# Patient Record
Sex: MALE | Race: WHITE | NOT HISPANIC OR LATINO
[De-identification: names, ages, dates, MRNs, and addresses within clinical notes are randomized per-mention and may not be internally consistent; named-entity substitution may affect disease eponyms.]

---

## 2017-05-05 ENCOUNTER — APPOINTMENT (OUTPATIENT)
Dept: ORTHOPEDIC SURGERY | Facility: CLINIC | Age: 72
End: 2017-05-05

## 2017-05-05 VITALS — BODY MASS INDEX: 24.5 KG/M2 | HEIGHT: 71 IN | WEIGHT: 175 LBS

## 2017-05-05 DIAGNOSIS — M77.01 MEDIAL EPICONDYLITIS, RIGHT ELBOW: ICD-10-CM

## 2017-05-14 PROBLEM — M77.01 MEDIAL EPICONDYLITIS, RIGHT: Status: ACTIVE | Noted: 2017-05-14

## 2017-07-13 ENCOUNTER — APPOINTMENT (OUTPATIENT)
Dept: OTOLARYNGOLOGY | Facility: CLINIC | Age: 72
End: 2017-07-13

## 2018-06-19 ENCOUNTER — RECORD ABSTRACTING (OUTPATIENT)
Age: 73
End: 2018-06-19

## 2018-06-19 DIAGNOSIS — Z87.39 PERSONAL HISTORY OF OTHER DISEASES OF THE MUSCULOSKELETAL SYSTEM AND CONNECTIVE TISSUE: ICD-10-CM

## 2018-06-19 DIAGNOSIS — M54.12 RADICULOPATHY, CERVICAL REGION: ICD-10-CM

## 2018-06-19 DIAGNOSIS — Z83.3 FAMILY HISTORY OF DIABETES MELLITUS: ICD-10-CM

## 2018-06-19 DIAGNOSIS — Z82.49 FAMILY HISTORY OF ISCHEMIC HEART DISEASE AND OTHER DISEASES OF THE CIRCULATORY SYSTEM: ICD-10-CM

## 2018-06-19 DIAGNOSIS — Z79.52 LONG TERM (CURRENT) USE OF SYSTEMIC STEROIDS: ICD-10-CM

## 2018-06-19 DIAGNOSIS — M25.522 PAIN IN LEFT ELBOW: ICD-10-CM

## 2018-06-19 DIAGNOSIS — Z87.891 PERSONAL HISTORY OF NICOTINE DEPENDENCE: ICD-10-CM

## 2018-06-19 DIAGNOSIS — T14.8XXA OTHER INJURY OF UNSPECIFIED BODY REGION, INITIAL ENCOUNTER: ICD-10-CM

## 2018-06-19 RX ORDER — ASPIRIN ENTERIC COATED TABLETS 81 MG 81 MG/1
81 TABLET, DELAYED RELEASE ORAL DAILY
Refills: 0 | Status: ACTIVE | COMMUNITY

## 2018-06-26 ENCOUNTER — APPOINTMENT (OUTPATIENT)
Dept: ORTHOPEDIC SURGERY | Facility: CLINIC | Age: 73
End: 2018-06-26
Payer: MEDICARE

## 2018-06-26 VITALS
SYSTOLIC BLOOD PRESSURE: 141 MMHG | DIASTOLIC BLOOD PRESSURE: 85 MMHG | BODY MASS INDEX: 24.92 KG/M2 | HEART RATE: 59 BPM | WEIGHT: 178 LBS | HEIGHT: 71 IN

## 2018-06-26 PROCEDURE — 99214 OFFICE O/P EST MOD 30 MIN: CPT

## 2018-06-26 PROCEDURE — 73630 X-RAY EXAM OF FOOT: CPT | Mod: RT

## 2018-08-19 ENCOUNTER — TRANSCRIPTION ENCOUNTER (OUTPATIENT)
Age: 73
End: 2018-08-19

## 2018-08-20 ENCOUNTER — OUTPATIENT (OUTPATIENT)
Dept: OUTPATIENT SERVICES | Facility: HOSPITAL | Age: 73
LOS: 1 days | End: 2018-08-20
Payer: MEDICARE

## 2018-08-20 VITALS
SYSTOLIC BLOOD PRESSURE: 111 MMHG | DIASTOLIC BLOOD PRESSURE: 66 MMHG | RESPIRATION RATE: 18 BRPM | HEART RATE: 61 BPM | OXYGEN SATURATION: 96 %

## 2018-08-20 VITALS
HEIGHT: 69.5 IN | TEMPERATURE: 98 F | HEART RATE: 52 BPM | OXYGEN SATURATION: 96 % | DIASTOLIC BLOOD PRESSURE: 74 MMHG | WEIGHT: 175.93 LBS | RESPIRATION RATE: 10 BRPM | SYSTOLIC BLOOD PRESSURE: 135 MMHG

## 2018-08-20 DIAGNOSIS — Z98.52 VASECTOMY STATUS: Chronic | ICD-10-CM

## 2018-08-20 DIAGNOSIS — H25.12 AGE-RELATED NUCLEAR CATARACT, LEFT EYE: ICD-10-CM

## 2018-08-20 DIAGNOSIS — D17.1 BENIGN LIPOMATOUS NEOPLASM OF SKIN AND SUBCUTANEOUS TISSUE OF TRUNK: Chronic | ICD-10-CM

## 2018-08-20 DIAGNOSIS — Z98.890 OTHER SPECIFIED POSTPROCEDURAL STATES: Chronic | ICD-10-CM

## 2018-08-20 PROCEDURE — V2788: CPT

## 2018-08-20 PROCEDURE — 66984 XCAPSL CTRC RMVL W/O ECP: CPT | Mod: LT

## 2018-08-20 NOTE — ASU DISCHARGE PLAN (ADULT/PEDIATRIC). - NOTIFY
Persistent Nausea and Vomiting/Inability to Tolerate Liquids or Foods/Pain not relieved by Medications/Bleeding that does not stop/Fever greater than 101

## 2019-06-04 ENCOUNTER — TRANSCRIPTION ENCOUNTER (OUTPATIENT)
Age: 74
End: 2019-06-04

## 2019-06-20 PROBLEM — I10 ESSENTIAL (PRIMARY) HYPERTENSION: Chronic | Status: ACTIVE | Noted: 2018-08-20

## 2019-06-20 PROBLEM — E78.5 HYPERLIPIDEMIA, UNSPECIFIED: Chronic | Status: ACTIVE | Noted: 2018-08-20

## 2019-07-26 ENCOUNTER — APPOINTMENT (OUTPATIENT)
Dept: INTERNAL MEDICINE | Facility: CLINIC | Age: 74
End: 2019-07-26
Payer: MEDICARE

## 2019-07-26 VITALS
TEMPERATURE: 98 F | DIASTOLIC BLOOD PRESSURE: 80 MMHG | HEIGHT: 71 IN | WEIGHT: 175 LBS | HEART RATE: 59 BPM | BODY MASS INDEX: 24.5 KG/M2 | SYSTOLIC BLOOD PRESSURE: 120 MMHG | OXYGEN SATURATION: 98 %

## 2019-07-26 DIAGNOSIS — Z86.39 PERSONAL HISTORY OF OTHER ENDOCRINE, NUTRITIONAL AND METABOLIC DISEASE: ICD-10-CM

## 2019-07-26 DIAGNOSIS — Z86.79 PERSONAL HISTORY OF OTHER DISEASES OF THE CIRCULATORY SYSTEM: ICD-10-CM

## 2019-07-26 PROCEDURE — 93000 ELECTROCARDIOGRAM COMPLETE: CPT

## 2019-07-26 PROCEDURE — 82272 OCCULT BLD FECES 1-3 TESTS: CPT

## 2019-07-26 PROCEDURE — 36415 COLL VENOUS BLD VENIPUNCTURE: CPT

## 2019-07-26 PROCEDURE — G0439: CPT

## 2019-07-26 NOTE — HEALTH RISK ASSESSMENT
[With Patient/Caregiver] : With Patient/Caregiver [Designated Healthcare Proxy] : Designated healthcare proxy [Relationship: ___] : Relationship: [unfilled] [I will adhere to the patient's wishes as expressed in the advance directive except as noted below.] : I will adhere to the patient's wishes as expressed in the advance directive except as noted below [AdvancecareDate] : 7/26/2019

## 2019-07-26 NOTE — ASSESSMENT
[FreeTextEntry1] : normal exam-testing will be done. He is to he colonoscopy for further evaluation his colorectal area

## 2019-07-26 NOTE — HISTORY OF PRESENT ILLNESS
[de-identified] : the patient i74-year-old male who comes in for comprehensive physical. He is treated for hypertension and hyperlipidemia and his blood pressure has been in good control. he has had a up for palpitationsby cardiologyand the testing was negative. He is physically activeand denies chest pain swelling fainting winded or cough and  is no longer getting palpitations. GI review systems is normalwithout change in bowel r melenaand he is du for colonoscopy. He has no  symptoms At the present time he takes 2 dand while he stopped smokingat age 33 he would occasionally have one cigarette per week

## 2019-07-26 NOTE — PHYSICAL EXAM
[Normal] : normal gait, coordination grossly intact, no focal deficits [FreeTextEntry1] : guaiac is negative. A fold is felt  distal common at 8:00 and will be seen with colonoscopy [de-identified] : BPH withou nodule

## 2019-07-27 LAB
ALBUMIN SERPL ELPH-MCNC: 4.4 G/DL
ALP BLD-CCNC: 53 U/L
ALT SERPL-CCNC: 25 U/L
ANION GAP SERPL CALC-SCNC: 11 MMOL/L
APPEARANCE: ABNORMAL
AST SERPL-CCNC: 19 U/L
BACTERIA: NEGATIVE
BASOPHILS # BLD AUTO: 0.04 K/UL
BASOPHILS NFR BLD AUTO: 0.7 %
BILIRUB SERPL-MCNC: 2.3 MG/DL
BILIRUBIN URINE: NEGATIVE
BLOOD URINE: NEGATIVE
BUN SERPL-MCNC: 30 MG/DL
CALCIUM SERPL-MCNC: 9.5 MG/DL
CHLORIDE SERPL-SCNC: 105 MMOL/L
CHOLEST SERPL-MCNC: 187 MG/DL
CHOLEST/HDLC SERPL: 2.2 RATIO
CK SERPL-CCNC: 199 U/L
CO2 SERPL-SCNC: 27 MMOL/L
COLOR: YELLOW
CREAT SERPL-MCNC: 1.03 MG/DL
EOSINOPHIL # BLD AUTO: 0.1 K/UL
EOSINOPHIL NFR BLD AUTO: 1.8 %
GLUCOSE QUALITATIVE U: NEGATIVE
GLUCOSE SERPL-MCNC: 109 MG/DL
HCT VFR BLD CALC: 44.7 %
HDLC SERPL-MCNC: 86 MG/DL
HGB BLD-MCNC: 14.7 G/DL
HYALINE CASTS: 0 /LPF
IMM GRANULOCYTES NFR BLD AUTO: 0.2 %
KETONES URINE: NEGATIVE
LDLC SERPL CALC-MCNC: 89 MG/DL
LEUKOCYTE ESTERASE URINE: NEGATIVE
LYMPHOCYTES # BLD AUTO: 1.79 K/UL
LYMPHOCYTES NFR BLD AUTO: 32.9 %
MAN DIFF?: NORMAL
MCHC RBC-ENTMCNC: 29.8 PG
MCHC RBC-ENTMCNC: 32.9 GM/DL
MCV RBC AUTO: 90.5 FL
MICROSCOPIC-UA: NORMAL
MONOCYTES # BLD AUTO: 0.5 K/UL
MONOCYTES NFR BLD AUTO: 9.2 %
NEUTROPHILS # BLD AUTO: 3 K/UL
NEUTROPHILS NFR BLD AUTO: 55.2 %
NITRITE URINE: NEGATIVE
PH URINE: 5.5
PLATELET # BLD AUTO: 199 K/UL
POTASSIUM SERPL-SCNC: 4.8 MMOL/L
PROT SERPL-MCNC: 7.3 G/DL
PROTEIN URINE: NORMAL
RBC # BLD: 4.94 M/UL
RBC # FLD: 13.7 %
RED BLOOD CELLS URINE: 0 /HPF
SODIUM SERPL-SCNC: 143 MMOL/L
SPECIFIC GRAVITY URINE: 1.03
SQUAMOUS EPITHELIAL CELLS: 0 /HPF
TRIGL SERPL-MCNC: 59 MG/DL
UROBILINOGEN URINE: NORMAL
WBC # FLD AUTO: 5.44 K/UL
WHITE BLOOD CELLS URINE: 0 /HPF

## 2019-08-15 ENCOUNTER — APPOINTMENT (OUTPATIENT)
Dept: INTERNAL MEDICINE | Facility: CLINIC | Age: 74
End: 2019-08-15
Payer: MEDICARE

## 2019-08-15 PROCEDURE — 36415 COLL VENOUS BLD VENIPUNCTURE: CPT

## 2019-08-16 LAB
ESTIMATED AVERAGE GLUCOSE: 108 MG/DL
HBA1C MFR BLD HPLC: 5.4 %

## 2019-11-25 ENCOUNTER — RECORD ABSTRACTING (OUTPATIENT)
Age: 74
End: 2019-11-25

## 2019-11-25 DIAGNOSIS — Z87.898 PERSONAL HISTORY OF OTHER SPECIFIED CONDITIONS: ICD-10-CM

## 2019-11-25 DIAGNOSIS — Z28.21 IMMUNIZATION NOT CARRIED OUT BECAUSE OF PATIENT REFUSAL: ICD-10-CM

## 2020-06-23 ENCOUNTER — EMERGENCY (EMERGENCY)
Facility: HOSPITAL | Age: 75
LOS: 1 days | Discharge: HOME | End: 2020-06-23
Attending: EMERGENCY MEDICINE | Admitting: EMERGENCY MEDICINE
Payer: COMMERCIAL

## 2020-06-23 VITALS
HEIGHT: 70 IN | SYSTOLIC BLOOD PRESSURE: 116 MMHG | TEMPERATURE: 98 F | WEIGHT: 166.89 LBS | RESPIRATION RATE: 17 BRPM | HEART RATE: 66 BPM | DIASTOLIC BLOOD PRESSURE: 73 MMHG | OXYGEN SATURATION: 97 %

## 2020-06-23 VITALS
RESPIRATION RATE: 16 BRPM | HEART RATE: 75 BPM | DIASTOLIC BLOOD PRESSURE: 82 MMHG | SYSTOLIC BLOOD PRESSURE: 132 MMHG | OXYGEN SATURATION: 96 %

## 2020-06-23 DIAGNOSIS — Z98.52 VASECTOMY STATUS: Chronic | ICD-10-CM

## 2020-06-23 DIAGNOSIS — D17.1 BENIGN LIPOMATOUS NEOPLASM OF SKIN AND SUBCUTANEOUS TISSUE OF TRUNK: Chronic | ICD-10-CM

## 2020-06-23 DIAGNOSIS — Z98.890 OTHER SPECIFIED POSTPROCEDURAL STATES: Chronic | ICD-10-CM

## 2020-06-23 LAB
ALBUMIN SERPL ELPH-MCNC: 3.6 G/DL — SIGNIFICANT CHANGE UP (ref 3.3–5)
ALP SERPL-CCNC: 52 U/L — SIGNIFICANT CHANGE UP (ref 40–120)
ALT FLD-CCNC: 34 U/L — SIGNIFICANT CHANGE UP (ref 10–45)
ANION GAP SERPL CALC-SCNC: 10 MMOL/L — SIGNIFICANT CHANGE UP (ref 5–17)
APTT BLD: 31.5 SEC — SIGNIFICANT CHANGE UP (ref 27.5–36.3)
AST SERPL-CCNC: 23 U/L — SIGNIFICANT CHANGE UP (ref 10–40)
BASOPHILS # BLD AUTO: 0.05 K/UL — SIGNIFICANT CHANGE UP (ref 0–0.2)
BASOPHILS NFR BLD AUTO: 0.7 % — SIGNIFICANT CHANGE UP (ref 0–2)
BILIRUB SERPL-MCNC: 1.6 MG/DL — HIGH (ref 0.2–1.2)
BUN SERPL-MCNC: 22 MG/DL — SIGNIFICANT CHANGE UP (ref 7–23)
CALCIUM SERPL-MCNC: 8.7 MG/DL — SIGNIFICANT CHANGE UP (ref 8.4–10.5)
CHLORIDE SERPL-SCNC: 106 MMOL/L — SIGNIFICANT CHANGE UP (ref 96–108)
CO2 SERPL-SCNC: 26 MMOL/L — SIGNIFICANT CHANGE UP (ref 22–31)
CREAT SERPL-MCNC: 1.14 MG/DL — SIGNIFICANT CHANGE UP (ref 0.5–1.3)
EOSINOPHIL # BLD AUTO: 0.1 K/UL — SIGNIFICANT CHANGE UP (ref 0–0.5)
EOSINOPHIL NFR BLD AUTO: 1.4 % — SIGNIFICANT CHANGE UP (ref 0–6)
ETHANOL SERPL-MCNC: 56 MG/DL — HIGH (ref 0–3)
GLUCOSE SERPL-MCNC: 98 MG/DL — SIGNIFICANT CHANGE UP (ref 70–99)
HCT VFR BLD CALC: 41.1 % — SIGNIFICANT CHANGE UP (ref 39–50)
HGB BLD-MCNC: 13.7 G/DL — SIGNIFICANT CHANGE UP (ref 13–17)
IMM GRANULOCYTES NFR BLD AUTO: 0.1 % — SIGNIFICANT CHANGE UP (ref 0–1.5)
INR BLD: 0.97 RATIO — SIGNIFICANT CHANGE UP (ref 0.88–1.16)
LYMPHOCYTES # BLD AUTO: 2.66 K/UL — SIGNIFICANT CHANGE UP (ref 1–3.3)
LYMPHOCYTES # BLD AUTO: 36.8 % — SIGNIFICANT CHANGE UP (ref 13–44)
MCHC RBC-ENTMCNC: 29.3 PG — SIGNIFICANT CHANGE UP (ref 27–34)
MCHC RBC-ENTMCNC: 33.3 GM/DL — SIGNIFICANT CHANGE UP (ref 32–36)
MCV RBC AUTO: 87.8 FL — SIGNIFICANT CHANGE UP (ref 80–100)
MONOCYTES # BLD AUTO: 0.59 K/UL — SIGNIFICANT CHANGE UP (ref 0–0.9)
MONOCYTES NFR BLD AUTO: 8.2 % — SIGNIFICANT CHANGE UP (ref 2–14)
NEUTROPHILS # BLD AUTO: 3.81 K/UL — SIGNIFICANT CHANGE UP (ref 1.8–7.4)
NEUTROPHILS NFR BLD AUTO: 52.8 % — SIGNIFICANT CHANGE UP (ref 43–77)
NRBC # BLD: 0 /100 WBCS — SIGNIFICANT CHANGE UP (ref 0–0)
PLATELET # BLD AUTO: 176 K/UL — SIGNIFICANT CHANGE UP (ref 150–400)
POTASSIUM SERPL-MCNC: 3.7 MMOL/L — SIGNIFICANT CHANGE UP (ref 3.5–5.3)
POTASSIUM SERPL-SCNC: 3.7 MMOL/L — SIGNIFICANT CHANGE UP (ref 3.5–5.3)
PROT SERPL-MCNC: 6.9 G/DL — SIGNIFICANT CHANGE UP (ref 6–8.3)
PROTHROM AB SERPL-ACNC: 10.9 SEC — SIGNIFICANT CHANGE UP (ref 10–12.9)
RBC # BLD: 4.68 M/UL — SIGNIFICANT CHANGE UP (ref 4.2–5.8)
RBC # FLD: 13.7 % — SIGNIFICANT CHANGE UP (ref 10.3–14.5)
SODIUM SERPL-SCNC: 142 MMOL/L — SIGNIFICANT CHANGE UP (ref 135–145)
TROPONIN I SERPL-MCNC: <.017 NG/ML — LOW (ref 0.02–0.06)
WBC # BLD: 7.22 K/UL — SIGNIFICANT CHANGE UP (ref 3.8–10.5)
WBC # FLD AUTO: 7.22 K/UL — SIGNIFICANT CHANGE UP (ref 3.8–10.5)

## 2020-06-23 PROCEDURE — 80053 COMPREHEN METABOLIC PANEL: CPT

## 2020-06-23 PROCEDURE — 85730 THROMBOPLASTIN TIME PARTIAL: CPT

## 2020-06-23 PROCEDURE — 70450 CT HEAD/BRAIN W/O DYE: CPT | Mod: 26

## 2020-06-23 PROCEDURE — 85610 PROTHROMBIN TIME: CPT

## 2020-06-23 PROCEDURE — 82962 GLUCOSE BLOOD TEST: CPT

## 2020-06-23 PROCEDURE — 99285 EMERGENCY DEPT VISIT HI MDM: CPT

## 2020-06-23 PROCEDURE — 93005 ELECTROCARDIOGRAM TRACING: CPT

## 2020-06-23 PROCEDURE — 93010 ELECTROCARDIOGRAM REPORT: CPT

## 2020-06-23 PROCEDURE — 82948 REAGENT STRIP/BLOOD GLUCOSE: CPT

## 2020-06-23 PROCEDURE — 85027 COMPLETE CBC AUTOMATED: CPT

## 2020-06-23 PROCEDURE — 80307 DRUG TEST PRSMV CHEM ANLYZR: CPT

## 2020-06-23 PROCEDURE — 36415 COLL VENOUS BLD VENIPUNCTURE: CPT

## 2020-06-23 PROCEDURE — 84484 ASSAY OF TROPONIN QUANT: CPT

## 2020-06-23 PROCEDURE — 71045 X-RAY EXAM CHEST 1 VIEW: CPT | Mod: 26

## 2020-06-23 PROCEDURE — 70450 CT HEAD/BRAIN W/O DYE: CPT

## 2020-06-23 PROCEDURE — 99285 EMERGENCY DEPT VISIT HI MDM: CPT | Mod: 25

## 2020-06-23 PROCEDURE — 71045 X-RAY EXAM CHEST 1 VIEW: CPT

## 2020-06-23 NOTE — ED PROVIDER NOTE - NSFOLLOWUPINSTRUCTIONS_ED_ALL_ED_FT
you were seen for confusion and unsteady gait after you had few drink,   your Ct scan of head  is normal   drink plenty liquids   and follow up with your doctor in 1-2 days.   return to ER if any problem

## 2020-06-23 NOTE — ED PROVIDER NOTE - CHPI ED SYMPTOMS NEG
no blurred vision/no dizziness/no fever/no loss of consciousness/no nausea/no numbness/no vomiting/no weakness

## 2020-06-23 NOTE — ED ADULT NURSE NOTE - OBJECTIVE STATEMENT
Pt is alert, oriented x 3,  brought to the ER by his wife after we was noted to be wobbly when he enters the house from the outside and was noted by wife to be somewhat confused. Pt did not fall and admitted to having had 3 drinks today. He usually gets 2 drinks a day. Denies any chest pain or SOB or dizziness or any weakness on presentation. Denies any sort of bleeding . History of HTN, HLD.

## 2020-06-23 NOTE — ED PROVIDER NOTE - CLINICAL SUMMARY MEDICAL DECISION MAKING FREE TEXT BOX
74 y/o M h/o HTN pw ataxia, knocking into table and confusion as per wife after 2 glasses of scotch and 1 glass of wine approx 15 minutes prior to arrival. Patient currently A+Ox3 in triage, able to answer questions appropriately. Denies chest pain, shortness of breath, weakness, numbness, tingling. Called for stroke eval. NIH score 0. Likely intoxicated. Will check CT head, labs, EKG, Alcohol level and reassess.

## 2020-06-23 NOTE — ED PROVIDER NOTE - ATTENDING CONTRIBUTION TO CARE
I have personally seen and examined this patient. I have fully participated in the care of this patient. I have reviewed all pertinent clinical information, including history physical exam, plan and the PA's note and agree except as noted  76 y/o M h/o HTN pw ataxia, knocking into table and confusion as per wife after 2 glasses of scotch and 1 glass of wine approx 15 minutes prior to arrival. Patient currently A+Ox3 in triage, able to answer questions appropriately. Called for stroke eval. NIH score 0. Denies chest pain, shortness of breath, weakness, numbness, tingling.  PE: General:     NAD, well-nourished, well-appearing  Head:     NC/AT, EOMI, oral mucosa moist  Neck:     supple  Lungs:     CTA b/l, no w/r/r  CVS:     S1S2, RRR, no m/g/r  Abd:     +BS, s/nt/nd, no organomegaly  Ext:    2+ radial and pedal pulses, no c/c/e  Neuro: grossly intact

## 2020-06-23 NOTE — ED ADULT NURSE NOTE - NSIMPLEMENTINTERV_GEN_ALL_ED
Implemented All Fall Risk Interventions:  [place] to call system. Call bell, personal items and telephone within reach. Instruct patient to call for assistance. Room bathroom lighting operational. Non-slip footwear when patient is off stretcher. Physically safe environment: no spills, clutter or unnecessary equipment. Stretcher in lowest position, wheels locked, appropriate side rails in place. Provide visual cue, wrist band, yellow gown, etc. Monitor gait and stability. Monitor for mental status changes and reorient to person, place, and time. Review medications for side effects contributing to fall risk. Reinforce activity limits and safety measures with patient and family.

## 2020-06-23 NOTE — ED ADULT TRIAGE NOTE - CHIEF COMPLAINT QUOTE
Pt brought in by wife from home, stated pt was confused and wobbly when he came in to the house, pt admitted to drinking, , symptoms started about 15 minutes prior to arrival

## 2020-06-23 NOTE — ED PROVIDER NOTE - OBJECTIVE STATEMENT
74 y/o M h/o HTN pw ataxia, knocking into table and confusion as per wife after 2 glasses of scotch and 1 glass of wine approx 15 minutes prior to arrival. Patient currently A+Ox3 in triage, able to answer questions appropriately. Called for stroke eval. NIH score 0. Denies chest pain, shortness of breath, weakness, numbness, tingling.

## 2020-06-23 NOTE — ED PROVIDER NOTE - PATIENT PORTAL LINK FT
You can access the FollowMyHealth Patient Portal offered by [facility] by registering at the following website: [email or web address]. By joining [employer]’s FollowMyHealth portal, you will also be able to view your health information using other applications (apps) compatible with our system.

## 2020-06-24 ENCOUNTER — APPOINTMENT (OUTPATIENT)
Dept: INTERNAL MEDICINE | Facility: CLINIC | Age: 75
End: 2020-06-24
Payer: MEDICARE

## 2020-06-24 ENCOUNTER — INPATIENT (INPATIENT)
Facility: HOSPITAL | Age: 75
LOS: 1 days | Discharge: HOME | DRG: 69 | End: 2020-06-26
Attending: INTERNAL MEDICINE | Admitting: HOSPITALIST
Payer: MEDICARE

## 2020-06-24 ENCOUNTER — NON-APPOINTMENT (OUTPATIENT)
Age: 75
End: 2020-06-24

## 2020-06-24 ENCOUNTER — TRANSCRIPTION ENCOUNTER (OUTPATIENT)
Age: 75
End: 2020-06-24

## 2020-06-24 VITALS
DIASTOLIC BLOOD PRESSURE: 70 MMHG | HEIGHT: 71 IN | TEMPERATURE: 98.7 F | BODY MASS INDEX: 23.94 KG/M2 | HEART RATE: 91 BPM | WEIGHT: 171 LBS | OXYGEN SATURATION: 97 % | SYSTOLIC BLOOD PRESSURE: 130 MMHG

## 2020-06-24 VITALS
HEIGHT: 70 IN | RESPIRATION RATE: 19 BRPM | SYSTOLIC BLOOD PRESSURE: 145 MMHG | TEMPERATURE: 98 F | DIASTOLIC BLOOD PRESSURE: 84 MMHG | WEIGHT: 171.08 LBS | HEART RATE: 54 BPM | OXYGEN SATURATION: 95 %

## 2020-06-24 DIAGNOSIS — Z98.890 OTHER SPECIFIED POSTPROCEDURAL STATES: Chronic | ICD-10-CM

## 2020-06-24 DIAGNOSIS — R41.3 OTHER AMNESIA: ICD-10-CM

## 2020-06-24 DIAGNOSIS — G45.9 TRANSIENT CEREBRAL ISCHEMIC ATTACK, UNSPECIFIED: ICD-10-CM

## 2020-06-24 DIAGNOSIS — Z23 ENCOUNTER FOR IMMUNIZATION: ICD-10-CM

## 2020-06-24 DIAGNOSIS — E78.5 HYPERLIPIDEMIA, UNSPECIFIED: ICD-10-CM

## 2020-06-24 DIAGNOSIS — Z98.52 VASECTOMY STATUS: Chronic | ICD-10-CM

## 2020-06-24 DIAGNOSIS — E80.6 OTHER DISORDERS OF BILIRUBIN METABOLISM: ICD-10-CM

## 2020-06-24 DIAGNOSIS — D17.1 BENIGN LIPOMATOUS NEOPLASM OF SKIN AND SUBCUTANEOUS TISSUE OF TRUNK: Chronic | ICD-10-CM

## 2020-06-24 DIAGNOSIS — Z29.9 ENCOUNTER FOR PROPHYLACTIC MEASURES, UNSPECIFIED: ICD-10-CM

## 2020-06-24 DIAGNOSIS — I10 ESSENTIAL (PRIMARY) HYPERTENSION: ICD-10-CM

## 2020-06-24 DIAGNOSIS — R55 SYNCOPE AND COLLAPSE: ICD-10-CM

## 2020-06-24 DIAGNOSIS — Z02.9 ENCOUNTER FOR ADMINISTRATIVE EXAMINATIONS, UNSPECIFIED: ICD-10-CM

## 2020-06-24 LAB
ALBUMIN SERPL ELPH-MCNC: 4.5 G/DL — SIGNIFICANT CHANGE UP (ref 3.3–5)
ALP SERPL-CCNC: 54 U/L — SIGNIFICANT CHANGE UP (ref 40–120)
ALT FLD-CCNC: 24 U/L — SIGNIFICANT CHANGE UP (ref 10–45)
ANION GAP SERPL CALC-SCNC: 13 MMOL/L — SIGNIFICANT CHANGE UP (ref 5–17)
AST SERPL-CCNC: 24 U/L — SIGNIFICANT CHANGE UP (ref 10–40)
BASOPHILS # BLD AUTO: 0.04 K/UL — SIGNIFICANT CHANGE UP (ref 0–0.2)
BASOPHILS NFR BLD AUTO: 0.5 % — SIGNIFICANT CHANGE UP (ref 0–2)
BILIRUB SERPL-MCNC: 1.8 MG/DL — HIGH (ref 0.2–1.2)
BUN SERPL-MCNC: 30 MG/DL — HIGH (ref 7–23)
CALCIUM SERPL-MCNC: 9.3 MG/DL — SIGNIFICANT CHANGE UP (ref 8.4–10.5)
CHLORIDE SERPL-SCNC: 105 MMOL/L — SIGNIFICANT CHANGE UP (ref 96–108)
CO2 SERPL-SCNC: 22 MMOL/L — SIGNIFICANT CHANGE UP (ref 22–31)
CREAT SERPL-MCNC: 1.15 MG/DL — SIGNIFICANT CHANGE UP (ref 0.5–1.3)
EOSINOPHIL # BLD AUTO: 0.06 K/UL — SIGNIFICANT CHANGE UP (ref 0–0.5)
EOSINOPHIL NFR BLD AUTO: 0.7 % — SIGNIFICANT CHANGE UP (ref 0–6)
ETHANOL SERPL-MCNC: SIGNIFICANT CHANGE UP MG/DL (ref 0–10)
GLUCOSE SERPL-MCNC: 105 MG/DL — HIGH (ref 70–99)
HCT VFR BLD CALC: 43.5 % — SIGNIFICANT CHANGE UP (ref 39–50)
HGB BLD-MCNC: 14.3 G/DL — SIGNIFICANT CHANGE UP (ref 13–17)
IMM GRANULOCYTES NFR BLD AUTO: 0.1 % — SIGNIFICANT CHANGE UP (ref 0–1.5)
LYMPHOCYTES # BLD AUTO: 1.93 K/UL — SIGNIFICANT CHANGE UP (ref 1–3.3)
LYMPHOCYTES # BLD AUTO: 23.7 % — SIGNIFICANT CHANGE UP (ref 13–44)
MCHC RBC-ENTMCNC: 29 PG — SIGNIFICANT CHANGE UP (ref 27–34)
MCHC RBC-ENTMCNC: 32.9 GM/DL — SIGNIFICANT CHANGE UP (ref 32–36)
MCV RBC AUTO: 88.2 FL — SIGNIFICANT CHANGE UP (ref 80–100)
MONOCYTES # BLD AUTO: 0.62 K/UL — SIGNIFICANT CHANGE UP (ref 0–0.9)
MONOCYTES NFR BLD AUTO: 7.6 % — SIGNIFICANT CHANGE UP (ref 2–14)
NEUTROPHILS # BLD AUTO: 5.49 K/UL — SIGNIFICANT CHANGE UP (ref 1.8–7.4)
NEUTROPHILS NFR BLD AUTO: 67.4 % — SIGNIFICANT CHANGE UP (ref 43–77)
NRBC # BLD: 0 /100 WBCS — SIGNIFICANT CHANGE UP (ref 0–0)
PLATELET # BLD AUTO: 196 K/UL — SIGNIFICANT CHANGE UP (ref 150–400)
POTASSIUM SERPL-MCNC: 4.3 MMOL/L — SIGNIFICANT CHANGE UP (ref 3.5–5.3)
POTASSIUM SERPL-SCNC: 4.3 MMOL/L — SIGNIFICANT CHANGE UP (ref 3.5–5.3)
PROT SERPL-MCNC: 6.7 G/DL — SIGNIFICANT CHANGE UP (ref 6–8.3)
RBC # BLD: 4.93 M/UL — SIGNIFICANT CHANGE UP (ref 4.2–5.8)
RBC # FLD: 13.8 % — SIGNIFICANT CHANGE UP (ref 10.3–14.5)
SARS-COV-2 RNA SPEC QL NAA+PROBE: SIGNIFICANT CHANGE UP
SODIUM SERPL-SCNC: 140 MMOL/L — SIGNIFICANT CHANGE UP (ref 135–145)
TROPONIN T, HIGH SENSITIVITY RESULT: 7 NG/L — SIGNIFICANT CHANGE UP (ref 0–51)
TROPONIN T, HIGH SENSITIVITY RESULT: 8 NG/L — SIGNIFICANT CHANGE UP (ref 0–51)
WBC # BLD: 8.15 K/UL — SIGNIFICANT CHANGE UP (ref 3.8–10.5)
WBC # FLD AUTO: 8.15 K/UL — SIGNIFICANT CHANGE UP (ref 3.8–10.5)

## 2020-06-24 PROCEDURE — 70496 CT ANGIOGRAPHY HEAD: CPT | Mod: 26

## 2020-06-24 PROCEDURE — 36415 COLL VENOUS BLD VENIPUNCTURE: CPT

## 2020-06-24 PROCEDURE — 70498 CT ANGIOGRAPHY NECK: CPT | Mod: 26

## 2020-06-24 PROCEDURE — 99223 1ST HOSP IP/OBS HIGH 75: CPT | Mod: GC

## 2020-06-24 PROCEDURE — 99285 EMERGENCY DEPT VISIT HI MDM: CPT

## 2020-06-24 PROCEDURE — 93000 ELECTROCARDIOGRAM COMPLETE: CPT

## 2020-06-24 PROCEDURE — 71046 X-RAY EXAM CHEST 2 VIEWS: CPT | Mod: 26

## 2020-06-24 PROCEDURE — 93010 ELECTROCARDIOGRAM REPORT: CPT

## 2020-06-24 PROCEDURE — 99214 OFFICE O/P EST MOD 30 MIN: CPT | Mod: 25

## 2020-06-24 RX ORDER — VALSARTAN 80 MG/1
1 TABLET ORAL
Qty: 0 | Refills: 0 | DISCHARGE

## 2020-06-24 RX ORDER — AMLODIPINE BESYLATE 2.5 MG/1
1 TABLET ORAL
Qty: 0 | Refills: 0 | DISCHARGE

## 2020-06-24 RX ORDER — ASPIRIN/CALCIUM CARB/MAGNESIUM 324 MG
81 TABLET ORAL DAILY
Refills: 0 | Status: DISCONTINUED | OUTPATIENT
Start: 2020-06-24 | End: 2020-06-26

## 2020-06-24 RX ORDER — AMLODIPINE BESYLATE 2.5 MG/1
2.5 TABLET ORAL DAILY
Refills: 0 | Status: DISCONTINUED | OUTPATIENT
Start: 2020-06-24 | End: 2020-06-24

## 2020-06-24 RX ORDER — ATORVASTATIN CALCIUM 80 MG/1
10 TABLET, FILM COATED ORAL AT BEDTIME
Refills: 0 | Status: DISCONTINUED | OUTPATIENT
Start: 2020-06-24 | End: 2020-06-26

## 2020-06-24 RX ORDER — ENOXAPARIN SODIUM 100 MG/ML
40 INJECTION SUBCUTANEOUS DAILY
Refills: 0 | Status: DISCONTINUED | OUTPATIENT
Start: 2020-06-24 | End: 2020-06-24

## 2020-06-24 RX ORDER — ENOXAPARIN SODIUM 100 MG/ML
40 INJECTION SUBCUTANEOUS DAILY
Refills: 0 | Status: DISCONTINUED | OUTPATIENT
Start: 2020-06-24 | End: 2020-06-26

## 2020-06-24 RX ORDER — LOSARTAN POTASSIUM 100 MG/1
100 TABLET, FILM COATED ORAL DAILY
Refills: 0 | Status: DISCONTINUED | OUTPATIENT
Start: 2020-06-24 | End: 2020-06-26

## 2020-06-24 RX ORDER — CHOLECALCIFEROL (VITAMIN D3) 125 MCG
1000 CAPSULE ORAL DAILY
Refills: 0 | Status: DISCONTINUED | OUTPATIENT
Start: 2020-06-24 | End: 2020-06-26

## 2020-06-24 RX ADMIN — ATORVASTATIN CALCIUM 10 MILLIGRAM(S): 80 TABLET, FILM COATED ORAL at 21:33

## 2020-06-24 NOTE — DISCHARGE NOTE PROVIDER - CARE PROVIDER_API CALL
No retinal tears or retinal detachment seen on clinical exam today. Reviewed the signs and symptoms of retinal tear/retinal detachment and the importance of calling for prompt evaluation should there be increasing floaters, new flashing lights, or decreasing peripheral vision in either eye at any time. Observation recommended. Abdirahman Russ  NEUROLOGY  [address] [facility] [address]  [place] [address]  Phone: [phone]  Fax: [phone]  Follow Up Time: 1-3 days    Chintan Cavazos)  Internal Medicine  [address] [place] [address], [address]  [place] [address]  Phone: [phone]  Fax: [phone]  Follow Up Time: 1 week

## 2020-06-24 NOTE — ED PROVIDER NOTE - CLINICAL SUMMARY MEDICAL DECISION MAKING FREE TEXT BOX
kurtis pgy2: 76 yo m pw possible near syncope and amnestic episodes. concern for arrythmia vs CVA. pt hds, neuro intact, aaox4, well appearing. case dw Dr. Cavazos (pcp) who had concern for imbalance and PVCs in office. will check labs, ekg nsr, and obtain neuro consult. pt likely tba. mario reyes records reviewed.

## 2020-06-24 NOTE — DISCHARGE NOTE PROVIDER - HOSPITAL COURSE
History of Present Illness    75M PMHx HTN, HLD presents with near-syncope. Last night, patient was at home when stood up and knocked over a table; he notes that he could not recall the events afterwards. He denies falls or LOC. He was evaluated at by his PCP today and was found to have PVCs. Troponin was unremarkable.  Patient endorses feeling dizzy for the past few months. He had a stress test in [place] done about 3 weeks ago that was grossly unremarkable per patient. He denies chest pain, SOB, nausea/vomiting, palpitations, headache.         Emergency Department Course    - VS: -145, HR 54-71, RR 18-19, O2 Sat 95-98 on RA, Tmax 98.3    - Labs: CBC unremarkable; CMP - Na and K WNL, BUN/Cr 30/1.15, Glu 105; liver assay NML except total bilirubin 1.8. COVID swab negative.    - Imaging: CXR prelim - unremarkable.         Hospital Course History of Present Illness    75M PMHx HTN, HLD presents with near-syncope. Last night, patient was at home when stood up and knocked over a table; he notes that he could not recall the events afterwards. He denies falls or LOC. He was evaluated at by his PCP today and was found to have PVCs. Troponin was unremarkable. He had a stress test in [place] done about 3 weeks ago that was grossly unremarkable per patient. He denies chest pain, SOB, nausea/vomiting, palpitations, headache.         Emergency Department Course    - VS: -145, HR 54-71, RR 18-19, O2 Sat 95-98 on RA, Tmax 98.3    - Labs: CBC unremarkable; CMP - Na and K WNL, BUN/Cr 30/1.15, Glu 105; liver assay NML except total bilirubin 1.8. COVID swab negative.    - Imaging: CXR prelim - unremarkable.         Hospital Course        Syncope workup negative. Unremarkable MRI, TTE, and tele. PVCs two nights ago but none this past night( 6/25). Near syncopal episode most likely isolated event. Patient will follow up with PCP and cardiologist. History of Present Illness    75M PMHx HTN, HLD presents with near-syncope. Last night, patient was at home when stood up and knocked over a table; he notes that he could not recall the events afterwards. He denies falls or LOC. He was evaluated at by his PCP today and was found to have PVCs. Troponin was unremarkable. He had a stress test in [place] done about 3 weeks ago that was grossly unremarkable per patient. He denies chest pain, SOB, nausea/vomiting, palpitations, headache.         Emergency Department Course    - VS: -145, HR 54-71, RR 18-19, O2 Sat 95-98 on RA, Tmax 98.3    - Labs: CBC unremarkable; CMP - Na and K WNL, BUN/Cr 30/1.15, Glu 105; liver assay NML except total bilirubin 1.8. COVID swab negative.    - Imaging: CXR prelim - unremarkable.         Hospital Course    Patient admitted to the medical floor. Neurology was consulted and recommended brain imaging. CTA head and neck was negative; brain MRI was negative as well. Toxicology was negative. Assessment of derangements in  An evaluation of the heart was also negative for any structural abnormality. Patient was monitored on telemetry but noted to not have any PVCs over 24 hours on the date of discharge. Patient was advised to follow-up with Neurology for his possible TIA episode. He is also advised to follow-up with Cardiology when he goes back to [place].        On date of discharge, patient was evaluated and determined to be clinically stable; he was assessed to be an appropriate candidate for discharge.

## 2020-06-24 NOTE — H&P ADULT - PROBLEM SELECTOR PLAN 4
- Continue home statin - Continue home statin  - Will follow-up lipid panel - On admission, -145  - Continue home valsartan; will hold amlodipine pending orthostatics - On admission, -145  - Continue home ARB (per therapeutic exchange, losartan 100 equivalent to home dose of valsartan 160); will hold amlodipine pending orthostatics

## 2020-06-24 NOTE — ED PROVIDER NOTE - NS ED ROS FT
GENERAL: No fever or chills, //             EYES: no change in vision, //             HEENT: no trouble swallowing or speaking, //             CARDIAC: no chest pain, //              PULMONARY: no cough or SOB, //             GI: no abdominal pain, no nausea or no vomiting, no diarrhea or constipation, //             : No changes in urination,  //            SKIN: no rashes,  //            NEURO: no headache,  //             MSK: No joint pain otherwise as HPI or negative. ~Sharad Mary DO PGY2

## 2020-06-24 NOTE — CONSULT NOTE ADULT - SUBJECTIVE AND OBJECTIVE BOX
*************************************  NEUROLOGY CONSULT  SERVICE  **************************************    IRVING MARQUEZ  Male  MRN-[id number]     MRN# [id number]  HPI:  76 yo          ROS: All negative except as mentioned in HPI    PAST MEDICAL & SURGICAL HISTORY:  HLD (hyperlipidemia)  Hypertension  History of vasectomy  Lipoma of back  History of endoscopy  History of colonoscopy    MEDICATIONS  (STANDING):    MEDICATIONS  (PRN):    Allergies    No Known Allergies    Intolerances        VITAL SIGNS:  Vital Signs Last 24 Hrs  T(C): 36.7 (24 Jun 2020 14:57), Max: 36.8 (24 Jun 2020 14:04)  T(F): 98.1 (24 Jun 2020 14:57), Max: 98.3 (24 Jun 2020 14:04)  HR: 71 (24 Jun 2020 14:57) (54 - 71)  BP: 138/74 (24 Jun 2020 14:57) (138/74 - 145/84)  BP(mean): --  RR: 18 (24 Jun 2020 14:57) (18 - 19)  SpO2: 98% (24 Jun 2020 14:57) (95% - 98%)    PHYSICAL EXAMINATION:  General: Well-developed, well nourished, in no acute distress.  Eyes: Conjunctiva and sclera clear.  Neck: Supple, nontender  Lung: no respiratory distress noted  Neurologic:  - Mental Status:  Alert, awake, oriented to person, place, and time; Speech is fluent with intact naming, repetition, and comprehension; crosses midline, no extinction or neglect noted;   - Cranial Nerves II-XII:  VFF, No nystagmus noted, EOMI, PERRLA, V1-V3 intact, no facial asymmetry, t/p midline, SCM/trap intact.  - Motor:  Strength is 5/5 throughout.  There is no pronator drift.  Normal muscle bulk and tone throughout. No myoclonus or tremor.  - Reflexes:  Plantar responses flexor.  - Sensory:  Intact to light touch throughout.  - Coordination:  Finger-nose-finger without dysmetria.  Rapid alternating hand movements intact.  - Gait:   Normal steps, base, arm swing, and turning.  Heel and toe walking are normal.  Tandem gait is normal.  Romberg testing is negative.    LABS:                          14.3   8.15  )-----------( 196      ( 24 Jun 2020 14:52 )             43.5     06-24    140  |  105  |  30<H>  ----------------------------<  105<H>  4.3   |  22  |  1.15    Ca    9.3      24 Jun 2020 14:52    TPro  6.7  /  Alb  4.5  /  TBili  1.8<H>  /  DBili  x   /  AST  24  /  ALT  24  /  AlkPhos  54  06-24        RADIOLOGY & ADDITIONAL STUDIES: *************************************  NEUROLOGY CONSULT  SERVICE  **************************************    IRVING MARQUEZ  Male  MRN-[id number]     MRN# [id number]  HPI:  74 yo man          ROS: All negative except as mentioned in HPI    PAST MEDICAL & SURGICAL HISTORY:  HLD (hyperlipidemia)  Hypertension  History of vasectomy  Lipoma of back  History of endoscopy  History of colonoscopy    MEDICATIONS  (STANDING):    MEDICATIONS  (PRN):    Allergies    No Known Allergies    Intolerances        VITAL SIGNS:  Vital Signs Last 24 Hrs  T(C): 36.7 (24 Jun 2020 14:57), Max: 36.8 (24 Jun 2020 14:04)  T(F): 98.1 (24 Jun 2020 14:57), Max: 98.3 (24 Jun 2020 14:04)  HR: 71 (24 Jun 2020 14:57) (54 - 71)  BP: 138/74 (24 Jun 2020 14:57) (138/74 - 145/84)  BP(mean): --  RR: 18 (24 Jun 2020 14:57) (18 - 19)  SpO2: 98% (24 Jun 2020 14:57) (95% - 98%)    PHYSICAL EXAMINATION:  General: Well-developed, well nourished, in no acute distress.  Eyes: Conjunctiva and sclera clear.  Neck: Supple, nontender  Lung: no respiratory distress noted  Neurologic:  - Mental Status:  Alert, awake, oriented to person, place, and time; Speech is fluent with intact naming, repetition, and comprehension; crosses midline, no extinction or neglect noted;   - Cranial Nerves II-XII:  VFF, No nystagmus noted, EOMI, PERRLA, V1-V3 intact, no facial asymmetry, t/p midline, SCM/trap intact.  - Motor:  Strength is 5/5 throughout.  There is no pronator drift.  Normal muscle bulk and tone throughout. No myoclonus or tremor.  - Reflexes:  Plantar responses flexor.  - Sensory:  Intact to light touch throughout.  - Coordination:  Finger-nose-finger without dysmetria.  Rapid alternating hand movements intact.  - Gait:   Normal steps, base, arm swing, and turning.  Heel and toe walking are normal.  Tandem gait is normal.  Romberg testing is negative.    LABS:                          14.3   8.15  )-----------( 196      ( 24 Jun 2020 14:52 )             43.5     06-24    140  |  105  |  30<H>  ----------------------------<  105<H>  4.3   |  22  |  1.15    Ca    9.3      24 Jun 2020 14:52    TPro  6.7  /  Alb  4.5  /  TBili  1.8<H>  /  DBili  x   /  AST  24  /  ALT  24  /  AlkPhos  54  06-24        RADIOLOGY & ADDITIONAL STUDIES: *************************************  NEUROLOGY CONSULT  SERVICE  **************************************    IRVING MARQUEZ  Male  MRN-[id number]     MRN# [id number]  HPI:  76 yo man presented to ER for evaluation of episode of amnesia on julien of 6/23. Collateral from wife Vilma as pt cannot recall event of the hospital. Pt has been drinking 1 glass of wine and half glass of scotch daily for many years and both pt and wife deny any issues with it. Pt had drunk his scotch and wine was sitting at a table reading an e-book when wife heard loud noise, and found he knocked a side table down, said to wife "I was just tipsy." Wife denies any focal weakness, facial droop, speech disturbance, LOC during that episode. Denies prior such episodes. When they awoke this AM, pt stated to wife he did not remember the event/ER visit. Pt is very active, takes frequent walks, occasionally smokes, and is otherwise independent of ADLs. Wife does report a lot of financial/work stressors for pt recently but no loss of life/health issues. NIHSS =0, MRS =0    ROS: All negative except as mentioned in HPI    PAST MEDICAL & SURGICAL HISTORY:  HLD (hyperlipidemia)  Hypertension  History of vasectomy  Lipoma of back  History of endoscopy  History of colonoscopy    Allergies  No Known Allergies  Intolerances    VITAL SIGNS:  Vital Signs Last 24 Hrs  T(C): 36.7 (24 Jun 2020 14:57), Max: 36.8 (24 Jun 2020 14:04)  T(F): 98.1 (24 Jun 2020 14:57), Max: 98.3 (24 Jun 2020 14:04)  HR: 71 (24 Jun 2020 14:57) (54 - 71)  BP: 138/74 (24 Jun 2020 14:57) (138/74 - 145/84)  BP(mean): --  RR: 18 (24 Jun 2020 14:57) (18 - 19)  SpO2: 98% (24 Jun 2020 14:57) (95% - 98%)    PHYSICAL EXAMINATION:  General: Well-developed, well nourished, in no acute distress.  Eyes: Conjunctiva and sclera clear.  Neck: Supple, nontender  Lung: no respiratory distress noted  Neurologic:  - Mental Status:  Alert, awake, oriented to person, place, and time; Speech is fluent with intact naming, repetition, and comprehension; crosses midline, no extinction or neglect noted;   - Cranial Nerves II-XII:  VFF, No nystagmus noted, EOMI, PERRLA, V1-V3 intact, no facial asymmetry, t/p midline, SCM/trap intact.  - Motor:  Strength is 5/5 throughout.  There is no pronator drift.  Normal muscle bulk and tone throughout. No myoclonus or tremor.  - Reflexes:  Plantars mute  - Sensory:  Intact to light touch, pinprick, vibration, prop throughout.  - Coordination:  Finger-nose-finger, H2S without dysmetria.  Rapid alternating hand movements intact.  - Gait:   Normal steps, base, arm swing, and turning.  Unable to walk heel-front-of-toe.  Tandem gait is normal.  Romberg testing is negative.    LABS:                          14.3   8.15  )-----------( 196      ( 24 Jun 2020 14:52 )             43.5     06-24    140  |  105  |  30<H>  ----------------------------<  105<H>  4.3   |  22  |  1.15    Ca    9.3      24 Jun 2020 14:52    TPro  6.7  /  Alb  4.5  /  TBili  1.8<H>  /  DBili  x   /  AST  24  /  ALT  24  /  AlkPhos  54  06-24    RADIOLOGY & ADDITIONAL STUDIES:

## 2020-06-24 NOTE — H&P ADULT - ATTENDING COMMENTS
Patient seen and examined with the resident and team. I agree with the above findings unless noted below.  76 Y/O/M  with PMHx of HTN, HLD brought to the hospital after an episode of near syncope and EKG abnormality. He  was sitting at a table reading an e-book when wife heard loud noise, and found he had knocked the side table down. He denied any focal weakness, or loss of consciousness but doesn't recall the incidence . Now he denied any chest pain, palpitation nausea or vomiting.  CTA head and Neck is non diagnostic  Monitor on tele,   TTE  MRI brain   Neuro consult appreciated

## 2020-06-24 NOTE — DISCHARGE NOTE PROVIDER - NSDCMRMEDTOKEN_GEN_ALL_CORE_FT
amLODIPine 2.5 mg oral tablet: 1 tab(s) orally once a day  aspirin 81 mg oral tablet: 1 tab(s) orally once a day  Lipitor 10 mg oral tablet: 1 tab(s) orally once a day  valsartan 160 mg oral tablet: 1 tab(s) orally once a day  Vitamin D3: amLODIPine 2.5 mg oral tablet: 1 tab(s) orally once a day  aspirin 81 mg oral delayed release tablet: 1 tab(s) orally once a day  atorvastatin 10 mg oral tablet: 1 tab(s) orally once a day (at bedtime)  cholecalciferol oral tablet: 1000 unit(s) orally once a day  valsartan 160 mg oral tablet: 1 tab(s) orally once a day

## 2020-06-24 NOTE — H&P ADULT - PROBLEM SELECTOR PLAN 6
Transition of Care Status:  1. Name of PCP:  2. PCP contacted on admission: [  ] Y     [  ] N  3. PCP contacted at discharge: [  ] Y     [  ] N  4. Post-discharge appointment date and location:  5. Summary of handoff given to PCP: DVT ppx: Lovenox  Diet: DASH/TLC  Disposition: Pending clinical course  Code Status: Discussion to be had

## 2020-06-24 NOTE — ED PROVIDER NOTE - OBJECTIVE STATEMENT
76 yo m pmh HTN, HLD, pw episodes of amnesia. pt reports last night stood up and "knocked over table", however did not fall. pt reports for next few hours he can not recall the events although was eval at [facility] (different MRN) and was stroke code with negative w/u including ekg and trop and DC. while at PCP office today pt noted to have episode of PVCs. pt reports twice over last few months had similar episodes where he does not recall events for a few h, per wife he does not lose consciousness or fall. reports "normal" nuclear stress in [place], pt splits time between places, his cardiologist Is there. denies n/v, cp, sob, cough, congestion. 76 yo m pmh HTN, HLD, pw episode of amnesia. pt reports last night stood up and "knocked over table", however did not fall. pt reports for next few hours he can not recall the events although was eval at [facility] (different MRN) and was stroke code with negative w/u including ekg and trop and DC. while at PCP office today pt noted to have episode of PVCs. pt reports twice over last few months had episodes of feeling dizzy and almost "black out" per wife he does not lose consciousness or fall. reports "normal" nuclear stress in [place], pt splits time between places, his cardiologist Is there. denies n/v, cp, sob, cough, congestion.

## 2020-06-24 NOTE — H&P ADULT - NSICDXPASTSURGICALHX_GEN_ALL_CORE_FT
PAST SURGICAL HISTORY:  History of colonoscopy     History of endoscopy     History of vasectomy     Lipoma of back

## 2020-06-24 NOTE — CONSULT NOTE ADULT - ASSESSMENT
76 yo man presented to ER for evaluation of episode of amnesia on julien of 6/23. Collateral from wife Vilma as pt cannot recall event of the hospital.    Impression: Unclear etiology of events of 6/23. Possible TGA. Would rule out stroke with further imaging    [] check orthostatics  [] check ETOH level, Utox  [] CTA H&N  [] MRI Brain w/ and w/o   [] counseled pt on cutting down on ETOH    if imaging negative, would have him f.u outpt neurology for further testing    - Please have pt call to make appointment for f/u outpatient with neurologist Dr. Ag Russ at [phone] [address]; [place] [address] within 1-2 days of discharge.

## 2020-06-24 NOTE — DISCHARGE NOTE PROVIDER - PROVIDER TOKENS
PROVIDER:[TOKEN:[[id number]],FOLLOWUP:[1-3 days]],PROVIDER:[TOKEN:[[id number]],FOLLOWUP:[1 week]]

## 2020-06-24 NOTE — ED ADULT TRIAGE NOTE - CHIEF COMPLAINT QUOTE
near syncope last night --> seen at [place] ED, followed up PCP this morning --> sent to ED for abnormal EKG, asymptomatic in triage

## 2020-06-24 NOTE — H&P ADULT - PROBLEM SELECTOR PLAN 5
- HSQ DVT ppx: Lovenox  Diet: DASH/TLC  Disposition: Pending clinical course  Code Status: Discussion to be had - Continue home statin  - Will follow-up lipid panel in AM

## 2020-06-24 NOTE — ASSESSMENT
[FreeTextEntry1] : At present, the patient had an episode with loss of balance, and confusion. This could have been secondary to an arrhythmia, TIA, or mild stroke, especially in the posterior fossa or frontal lobe. We are getting EKG and bloods, and we'll get his records from length of period. We will also set up an MRI possibly a 24-hour take an carotid Dopplers. EKG shows unifocal VPCs, but is otherwise within normal limits. PMI and sending them to [place] to be further monitored and have a full workup

## 2020-06-24 NOTE — H&P ADULT - HISTORY OF PRESENT ILLNESS
75M PMHx HTN, HLD presents with near-syncope and EKG changes elicited at his primary care doctor's office.    In the ED:  VS: -145, HR 54-71, RR 18-19, O2 Sat 95-98 on RA, Tmax 98.3  Labs: CBC unremarkable; CMP - Na and K WNL, BUN/Cr 30/1.15, Glu 105; liver assay NML except total bilirubin 1.8. COVID swab negative. EKG demonstrates ____.  Imaging: CXR prelim - unremarkable.     Patient given: 75M PMH\ HTN, HLD presents with near-syncope. Last night, patient was a home when stood up and knocked over a table and could not recall the events afterwards. He denies falls or LOC. He was evaluated at by his PCP today and was found to have PVCs. Troponin was unremarkable.  Patient endorses feeling dizzy for the past few months. He had a stress test done about 3 weeks ago that was grossly unremarkable per patient. Denies chest pain, SOB, nausea/vomiting, palpitations, headache.       ED course  - VS: -145, HR 54-71, RR 18-19, O2 Sat 95-98 on RA, Tmax 98.3  - Labs: CBC unremarkable; CMP - Na and K WNL, BUN/Cr 30/1.15, Glu 105; liver assay NML except total bilirubin 1.8. COVID swab negative. EKG demonstrates ____.  - Imaging: CXR prelim - unremarkable.     Patient given: 75M PMHx HTN, HLD presents with near-syncope. Last night, patient was at home when stood up and knocked over a table while hitting his head in the process; he notes that he could not recall the events afterwards. He denies falls or LOC. He was evaluated at by his PCP today and was found to have PVCs. Troponin was unremarkable.  Patient endorses feeling dizzy for the past few months. He had a stress test in [place] done about 3 weeks ago that was grossly unremarkable per patient. He denies chest pain, SOB, nausea/vomiting, palpitations, headache.     ED course  - VS: -145, HR 54-71, RR 18-19, O2 Sat 95-98 on RA, Tmax 98.3  - Labs: CBC unremarkable; CMP - Na and K WNL, BUN/Cr 30/1.15, Glu 105; liver assay NML except total bilirubin 1.8. COVID swab negative. EKG demonstrates ____.  - Imaging: CXR prelim - unremarkable.     Patient given: 75M PMHx HTN, HLD presents with near-syncope. Last night, patient was at home when stood up and knocked over a table; he notes that he could not recall the events afterwards. He denies falls or LOC. He was evaluated at by his PCP today and was found to have PVCs. Troponin was unremarkable.  Patient endorses feeling dizzy for the past few months. He had a stress test in [place] done about 3 weeks ago that was grossly unremarkable per patient. He denies chest pain, SOB, nausea/vomiting, palpitations, headache.     ED course  - VS: -145, HR 54-71, RR 18-19, O2 Sat 95-98 on RA, Tmax 98.3  - Labs: CBC unremarkable; CMP - Na and K WNL, BUN/Cr 30/1.15, Glu 105; liver assay NML except total bilirubin 1.8. COVID swab negative.  - Imaging: CXR prelim - unremarkable.     Patient given: 75M PMHx HTN, HLD presents with near-syncope. Last night, patient was at home when stood up and knocked over a table; he notes that he could not recall the events afterwards. He denies falls or LOC. He was evaluated at by his PCP today and was found to have PVCs. Troponin was unremarkable.  Patient endorses feeling dizzy for the past few months. He had a stress test in [place] done about 3 weeks ago that was grossly unremarkable per patient. He denies chest pain, SOB, nausea/vomiting, palpitations, headache.     ED course  - VS: -145, HR 54-71, RR 18-19, O2 Sat 95-98 on RA, Tmax 98.3  - Labs: CBC unremarkable; CMP - Na and K WNL, BUN/Cr 30/1.15 (baseline Cr 1.03), Glu 105; liver assay NML except total bilirubin 1.8. COVID swab negative.  - Imaging: CXR prelim - unremarkable.

## 2020-06-24 NOTE — ED PROVIDER NOTE - PROGRESS NOTE DETAILS
Patient reassessed, NAD, non-toxic appearing. results dw pt, questions answered.  pending CTs. eval by neuro, rec MR. endorsed to hospitalist  - Sharad Mary D.O. PGY2

## 2020-06-24 NOTE — H&P ADULT - NSHPSOCIALHISTORY_GEN_ALL_CORE
Denies smoking  Endorses EtOH Denies smoking  Endorses EtOH  Family history - Non contributory ;  Father passed away in 80s and Mother passed away in late 70s

## 2020-06-24 NOTE — H&P ADULT - NSHPLABSRESULTS_GEN_ALL_CORE
14.3   8.15  )-----------( 196      ( 24 Jun 2020 14:52 )             43.5       06-24    140  |  105  |  30<H>  ----------------------------<  105<H>  4.3   |  22  |  1.15    Ca    9.3      24 Jun 2020 14:52    TPro  6.7  /  Alb  4.5  /  TBili  1.8<H>  /  DBili  x   /  AST  24  /  ALT  24  /  AlkPhos  54  06-24      COVID-19 PCR: NotDetec: This test has been validated by [employer] to be accurate;  though it has not been FDA cleared/approved by the usual pathway.  As with all laboratory tests, results should be correlated with clinical  findings.  https://www.fda.gov/media/967345/download  https://www.fda.gov/media/999839/download (06.24.20 @ 14:57)      EXAM:  XR CHEST PA LAT 2V                        PROCEDURE DATE:  06/24/2020      ******PRELIMINARY REPORT******    ******PRELIMINARY REPORT******          INTERPRETATION:  no emergent finding.    ******PRELIMINARY REPORT******    ******PRELIMINARY REPORT******          BRANT BOATENG M.D., RADIOLOGY RESIDENT

## 2020-06-24 NOTE — PHYSICAL EXAM
[Well Nourished] : well nourished [No Acute Distress] : no acute distress [Well Developed] : well developed [Normal Sclera/Conjunctiva] : normal sclera/conjunctiva [Well-Appearing] : well-appearing [Fundoscopic Exam Performed] : fundoscopic ~T exam ~C was performed [EOMI] : extraocular movements intact [PERRL] : pupils equal round and reactive to light [Normal Outer Ear/Nose] : the outer ears and nose were normal in appearance [Normal Oropharynx] : the oropharynx was normal [Supple] : supple [No JVD] : no jugular venous distention [No Lymphadenopathy] : no lymphadenopathy [No Accessory Muscle Use] : no accessory muscle use [No Respiratory Distress] : no respiratory distress  [Thyroid Normal, No Nodules] : the thyroid was normal and there were no nodules present [Clear to Auscultation] : lungs were clear to auscultation bilaterally [Regular Rhythm] : with a regular rhythm [Normal Rate] : normal rate  [No Carotid Bruits] : no carotid bruits [No Murmur] : no murmur heard [Normal S1, S2] : normal S1 and S2 [No Abdominal Bruit] : a ~M bruit was not heard ~T in the abdomen [No Varicosities] : no varicosities [Pedal Pulses Present] : the pedal pulses are present [No Edema] : there was no peripheral edema [No Palpable Aorta] : no palpable aorta [Soft] : abdomen soft [No Extremity Clubbing/Cyanosis] : no extremity clubbing/cyanosis [Non Tender] : non-tender [No Masses] : no abdominal mass palpated [Non-distended] : non-distended [Normal Posterior Cervical Nodes] : no posterior cervical lymphadenopathy [No HSM] : no HSM [Normal Bowel Sounds] : normal bowel sounds [Normal Anterior Cervical Nodes] : no anterior cervical lymphadenopathy [Normal] : no posterior cervical lymphadenopathy and no anterior cervical lymphadenopathy [No CVA Tenderness] : no CVA  tenderness [No Spinal Tenderness] : no spinal tenderness [No Joint Swelling] : no joint swelling [Grossly Normal Strength/Tone] : grossly normal strength/tone [No Rash] : no rash [Coordination Grossly Intact] : coordination grossly intact [No Focal Deficits] : no focal deficits [Deep Tendon Reflexes (DTR)] : deep tendon reflexes were 2+ and symmetric [Normal Affect] : the affect was normal [Normal Insight/Judgement] : insight and judgment were intact [de-identified] : Right fundus was well seen and that this was flat/left fundus was not well seen [de-identified] : Carotids are 2+2+ without bruit [de-identified] : Pulse was irregularly irregular [de-identified] : Exam was nonfocal except for tandem gait, which was slightly imbalanced

## 2020-06-24 NOTE — CONSULT NOTE ADULT - ATTENDING COMMENTS
VASCULAR NEUROLOGY ATTENDING  The patient is seen and examined the history and imaging are reviewed. I agree with the resident note unless otherwise noted. Low suspicion for cerebrovascular event. Outpatient neurology follow-up.

## 2020-06-24 NOTE — H&P ADULT - PROBLEM SELECTOR PLAN 1
- Unclear etiology: Differential include vasovagal, orthostatics   - CTA head and neck ordered  - Brain MRI pending   - Check orthostatic vitals  - TTE ordered  - Fall precautions - Unclear etiology: Differential include vasovagal, orthostatics   - CTA head and neck ordered  - Brain MRI pending   - Check orthostatic vitals  - TTE ordered  - Fall precautions  - Neurology following, appreciate recs - Single episode in the context of intermittent dizziness over several months  - Ddx: Vasovagal, orthostatic hypotension, cardiac structural abnormality, intracranial structural abnormality  - Neurology consulted, recommends CTA head and neck, MR brain  - F/u orthostatic vital signs  - F/u TTE to assess for cardiac structural abnormality  - Fall precautions  - Monitor for reoccurrence

## 2020-06-24 NOTE — HISTORY OF PRESENT ILLNESS
[de-identified] : Patient is a 65-year-old male, who has been treated for hypertension, and hyperlipidemia, but is otherwise been in good health, and while in [place] had a stress test, which was said to be negative. While home last night. His wife for the noise and when she went to the living room her as well as sitting on a chair but a table had been knocked over and he seemed somewhat confused. When he stood up he was in balanced and continued to be confused, but denied chest pain, palpitations, headache, weakness, or numbness on one side. He went to [facility] ER and EKG bloods and a CAT scan of his brain and all which were normal. By the time he went home 2 hours later, he appeared to be back to normal. The red for a while and went to sleep and this morning felt fine. He denies any further symptoms at this time

## 2020-06-24 NOTE — H&P ADULT - NSHPREVIEWOFSYSTEMS_GEN_ALL_CORE
CONSTITUTIONAL: No fevers   EYES/ENT: No visual changes;  No  throat pain   NECK: No pain   RESPIRATORY: No cough, wheezing, hemoptysis; No shortness of breath  CARDIOVASCULAR: No chest pain or palpitations  GASTROINTESTINAL: No abdominal or epigastric pain. No nausea, vomiting, or hematemesis; No diarrhea . No melena  GENITOURINARY: No dysuria, frequency or hematuria  NEUROLOGICAL: No numbness or weakness  SKIN: No itching, rashes

## 2020-06-24 NOTE — ED PROVIDER NOTE - PHYSICAL EXAMINATION
General: well appearing, interactive, well nourished, no apparent distress, ncat  HEENT: EOMI, PERRLA, normal mucosa, normal oropharynx, no lesions on the lips or on oral mucosa, normal external ear  Neck: supple, no lymphadenopathy, full range of motion, no nuchal rigidity  CV: RRR, normal S1 and S2 with no murmur, capillary refill less than two seconds  Resp: lungs CTA b/l, good aeration bilaterally, symmetric chest wall   Abd: non-distended, soft, non-tender  : no CVA tenderness  MSK: full range of motion, no cyanosis, no edema, no clubbing, no immobility  Neuro: CN2-12 grossly intact. EOMI. 5/5 strength in UE and LE b/l.  Sensation intact in UE/LE b/l b/l.  No dysdiadochokinesia. Gait nl   Skin: no rashes, skin intact

## 2020-06-24 NOTE — ED PROVIDER NOTE - ATTENDING CONTRIBUTION TO CARE
I performed a history and physical exam of the patient and discussed their management with the resident.  I reviewed the resident's note and agree with the documented findings and plan of care except as noted below. My medical decision making and observations are as follows:     75M p/w possible near syncope and amnestic episode.  Pt is A&O x 3. NAD, no complaints at this time  Pt is neuro intact, well appearing, heart rrr, lungs cta. case dw Dr. Cavazos (pcp) who had concern for imbalance and PVCs in office. concern for arrythmia vs CVA.  will check labs, ekg nsr, CTA head/neck and obtain neuro consult. pt likely tba.

## 2020-06-24 NOTE — H&P ADULT - PROBLEM SELECTOR PLAN 3
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

## 2020-06-24 NOTE — ED ADULT NURSE NOTE - CHIEF COMPLAINT QUOTE
near syncope last night --> seen at [facility] ED, followed up PCP this morning --> sent to ED for abnormal EKG, asymptomatic in triage

## 2020-06-24 NOTE — H&P ADULT - NSHPPHYSICALEXAM_GEN_ALL_CORE
Vital Signs Last 24 Hrs  T(C): 36.7 (24 Jun 2020 14:57), Max: 36.8 (24 Jun 2020 14:04)  T(F): 98.1 (24 Jun 2020 14:57), Max: 98.3 (24 Jun 2020 14:04)  HR: 71 (24 Jun 2020 14:57) (54 - 71)  BP: 138/74 (24 Jun 2020 14:57) (138/74 - 145/84)  BP(mean): --  RR: 18 (24 Jun 2020 14:57) (18 - 19)  SpO2: 98% (24 Jun 2020 14:57) (95% - 98%)    CONSTITUTIONAL: No acute distress. Awake and alert.  HEAD: No evidence of trauma. Structures WNL.  EYES: +PERRL. +EOMI. No scleral icterus. No conjunctival injection.  ENT: Moist oral mucosa. No erythema. No pharyngeal exudates.   NECK: Supple. Appropriate ROM. No stiffness. No masses or lymphadenopathy.  RESPIRATORY: CTAB. No wheezes, rales, or rhonchi. No accessory muscle use. No apparent respiratory distress.  CARDIOVASCULAR: +S1/S2. No audible S3/S4. Regular rate and rhythm. No murmurs, rubs, or gallops. 2+ radial pulses x b/l UE; 2+ DP pulses x b/l LE.   GASTROINTESTINAL: Soft, nontender, nondistended. +BS. No rebound or guarding.   BACK: No spinal or paraspinal tenderness. No CVA tenderness.  EXTREMITY: No LE swelling or edema. EXTs warm to touch.  MUSCULOSKELETAL: Spontaneous movement in all extremities.  DERMATOLOGICAL: No abnormal rashes or lesions.  NEUROLOGICAL: CN 2-12 grossly intact. No focal deficits. Sensation intact x 4EXT. A&Ox3 (oriented to person, place, and time).  PSYCHIATRIC: Appropriate affect.

## 2020-06-24 NOTE — H&P ADULT - ASSESSMENT
75M PMHx HTN, HLD presents with near-syncope in the setting of intermittent dizziness, found to have EKG changes at PMD's office.

## 2020-06-24 NOTE — H&P ADULT - PROBLEM SELECTOR PLAN 2
- DDx include structural vs EToH  - CTA head and neck ordered  - Brain MRI pending   - NEuro recs - Inability to recall presyncopal event  - DDx: intracranial structural abnormality, intoxication, infection  - Neurological imaging ordered per Neuro recs  - F/u toxicology studies  - Monitor for reoccurrence

## 2020-06-24 NOTE — DISCHARGE NOTE PROVIDER - NSDCCPCAREPLAN_GEN_ALL_CORE_FT
PRINCIPAL DISCHARGE DIAGNOSIS  Diagnosis: Pre-syncope  Assessment and Plan of Treatment: You were evaluated because of a fall and amnesia. You were evaluated by Neurology who recommended head scans. All of those scans were negative. You were suspected of having a TIA, which is a transient change in neurological function for period of less than 24 hours. You are advised to follow-up with Neurology.  Abdirahman Russ  NEUROLOGY  [address], [address]  [place] [address]  Phone: [phone]  Fax: [phone]  Follow Up Time: 1-3 days      SECONDARY DISCHARGE DIAGNOSES  Diagnosis: Asymptomatic PVCs  Assessment and Plan of Treatment: You were admitted because of a fall and amnesia. While being evaluated, you were noted to have premature ventricular contractions, contractions of the heart not in synchrony with your natural pacemaker. You were kept on telemetry to assess your heart activity. You were noted to have PVCs on admission but they went away. You had no symptoms. You are advised to follow-up with your cardiologist in [place].    Diagnosis: Hypertension  Assessment and Plan of Treatment: You were admitted because of a fall and amnesia. You were suspected of having low blood pressure as a possible cause of your fall. One of your medications was held to minimize the possibility of the event happening again. Special vital signs were taken and determined that you did not have low blood volume. You are advised to continue taking all of your high blood pressure medications. You are advised to follow-up with your primary care doctor.  Chintan Cavazos)  Internal Medicine  [address], [address]  [place] [address]  Phone: [phone]  Fax: [phone]  Follow Up Time: 1 week

## 2020-06-25 ENCOUNTER — TRANSCRIPTION ENCOUNTER (OUTPATIENT)
Age: 75
End: 2020-06-25

## 2020-06-25 PROBLEM — Z23 ENCOUNTER FOR IMMUNIZATION: Status: ACTIVE | Noted: 2020-06-25

## 2020-06-25 LAB
ALBUMIN SERPL ELPH-MCNC: 3.9 G/DL — SIGNIFICANT CHANGE UP (ref 3.3–5)
ALBUMIN SERPL ELPH-MCNC: 4.4 G/DL
ALP BLD-CCNC: 54 U/L
ALP SERPL-CCNC: 45 U/L — SIGNIFICANT CHANGE UP (ref 40–120)
ALT FLD-CCNC: 23 U/L — SIGNIFICANT CHANGE UP (ref 10–45)
ALT SERPL-CCNC: 25 U/L
ANION GAP SERPL CALC-SCNC: 12 MMOL/L
ANION GAP SERPL CALC-SCNC: 9 MMOL/L — SIGNIFICANT CHANGE UP (ref 5–17)
AST SERPL-CCNC: 19 U/L — SIGNIFICANT CHANGE UP (ref 10–40)
AST SERPL-CCNC: 23 U/L
BASOPHILS # BLD AUTO: 0.04 K/UL
BASOPHILS NFR BLD AUTO: 0.5 %
BILIRUB SERPL-MCNC: 1.5 MG/DL — HIGH (ref 0.2–1.2)
BILIRUB SERPL-MCNC: 1.9 MG/DL
BUN SERPL-MCNC: 26 MG/DL
BUN SERPL-MCNC: 28 MG/DL — HIGH (ref 7–23)
CALCIUM SERPL-MCNC: 8.9 MG/DL — SIGNIFICANT CHANGE UP (ref 8.4–10.5)
CALCIUM SERPL-MCNC: 9.7 MG/DL
CHLORIDE SERPL-SCNC: 105 MMOL/L
CHLORIDE SERPL-SCNC: 107 MMOL/L — SIGNIFICANT CHANGE UP (ref 96–108)
CHOLEST SERPL-MCNC: 161 MG/DL — SIGNIFICANT CHANGE UP (ref 10–199)
CO2 SERPL-SCNC: 24 MMOL/L
CO2 SERPL-SCNC: 25 MMOL/L — SIGNIFICANT CHANGE UP (ref 22–31)
CREAT SERPL-MCNC: 1 MG/DL — SIGNIFICANT CHANGE UP (ref 0.5–1.3)
CREAT SERPL-MCNC: 1.22 MG/DL
EOSINOPHIL # BLD AUTO: 0.05 K/UL
EOSINOPHIL NFR BLD AUTO: 0.7 %
ERYTHROCYTE [SEDIMENTATION RATE] IN BLOOD BY WESTERGREN METHOD: 13 MM/HR
GLUCOSE SERPL-MCNC: 101 MG/DL — HIGH (ref 70–99)
GLUCOSE SERPL-MCNC: 104 MG/DL
HCT VFR BLD CALC: 40.8 % — SIGNIFICANT CHANGE UP (ref 39–50)
HCT VFR BLD CALC: 45.1 %
HDLC SERPL-MCNC: 75 MG/DL — SIGNIFICANT CHANGE UP
HGB BLD-MCNC: 13.9 G/DL — SIGNIFICANT CHANGE UP (ref 13–17)
HGB BLD-MCNC: 14.7 G/DL
IMM GRANULOCYTES NFR BLD AUTO: 0.3 %
LIPID PNL WITH DIRECT LDL SERPL: 73 MG/DL — SIGNIFICANT CHANGE UP
LYMPHOCYTES # BLD AUTO: 1.98 K/UL
LYMPHOCYTES NFR BLD AUTO: 26.7 %
MAGNESIUM SERPL-MCNC: 2.4 MG/DL — SIGNIFICANT CHANGE UP (ref 1.6–2.6)
MAN DIFF?: NORMAL
MCHC RBC-ENTMCNC: 29 PG
MCHC RBC-ENTMCNC: 30 PG — SIGNIFICANT CHANGE UP (ref 27–34)
MCHC RBC-ENTMCNC: 32.6 GM/DL
MCHC RBC-ENTMCNC: 34.1 GM/DL — SIGNIFICANT CHANGE UP (ref 32–36)
MCV RBC AUTO: 87.9 FL — SIGNIFICANT CHANGE UP (ref 80–100)
MCV RBC AUTO: 89 FL
MONOCYTES # BLD AUTO: 0.57 K/UL
MONOCYTES NFR BLD AUTO: 7.7 %
NEUTROPHILS # BLD AUTO: 4.75 K/UL
NEUTROPHILS NFR BLD AUTO: 64.1 %
NRBC # BLD: 0 /100 WBCS — SIGNIFICANT CHANGE UP (ref 0–0)
PHOSPHATE SERPL-MCNC: 3.8 MG/DL — SIGNIFICANT CHANGE UP (ref 2.5–4.5)
PLATELET # BLD AUTO: 159 K/UL — SIGNIFICANT CHANGE UP (ref 150–400)
PLATELET # BLD AUTO: 188 K/UL
POTASSIUM SERPL-MCNC: 4 MMOL/L — SIGNIFICANT CHANGE UP (ref 3.5–5.3)
POTASSIUM SERPL-SCNC: 4 MMOL/L — SIGNIFICANT CHANGE UP (ref 3.5–5.3)
POTASSIUM SERPL-SCNC: 4.6 MMOL/L
PROT SERPL-MCNC: 6.3 G/DL — SIGNIFICANT CHANGE UP (ref 6–8.3)
PROT SERPL-MCNC: 6.6 G/DL
RBC # BLD: 4.64 M/UL — SIGNIFICANT CHANGE UP (ref 4.2–5.8)
RBC # BLD: 5.07 M/UL
RBC # FLD: 14.2 % — SIGNIFICANT CHANGE UP (ref 10.3–14.5)
RBC # FLD: 14.3 %
SODIUM SERPL-SCNC: 141 MMOL/L — SIGNIFICANT CHANGE UP (ref 135–145)
SODIUM SERPL-SCNC: 142 MMOL/L
TOTAL CHOLESTEROL/HDL RATIO MEASUREMENT: 2.1 RATIO — LOW (ref 3.4–9.6)
TRIGL SERPL-MCNC: 60 MG/DL — SIGNIFICANT CHANGE UP (ref 10–149)
WBC # BLD: 6.47 K/UL — SIGNIFICANT CHANGE UP (ref 3.8–10.5)
WBC # FLD AUTO: 6.47 K/UL — SIGNIFICANT CHANGE UP (ref 3.8–10.5)
WBC # FLD AUTO: 7.41 K/UL

## 2020-06-25 PROCEDURE — 93010 ELECTROCARDIOGRAM REPORT: CPT

## 2020-06-25 PROCEDURE — 99232 SBSQ HOSP IP/OBS MODERATE 35: CPT | Mod: GC

## 2020-06-25 PROCEDURE — 70553 MRI BRAIN STEM W/O & W/DYE: CPT | Mod: 26

## 2020-06-25 PROCEDURE — 93306 TTE W/DOPPLER COMPLETE: CPT | Mod: 26

## 2020-06-25 RX ADMIN — LOSARTAN POTASSIUM 100 MILLIGRAM(S): 100 TABLET, FILM COATED ORAL at 05:28

## 2020-06-25 RX ADMIN — ATORVASTATIN CALCIUM 10 MILLIGRAM(S): 80 TABLET, FILM COATED ORAL at 22:21

## 2020-06-25 RX ADMIN — Medication 1000 UNIT(S): at 11:03

## 2020-06-25 RX ADMIN — ENOXAPARIN SODIUM 40 MILLIGRAM(S): 100 INJECTION SUBCUTANEOUS at 11:03

## 2020-06-25 RX ADMIN — Medication 81 MILLIGRAM(S): at 11:03

## 2020-06-25 RX ADMIN — Medication 1 MILLIGRAM(S): at 14:16

## 2020-06-25 NOTE — PROGRESS NOTE ADULT - PROBLEM SELECTOR PLAN 1
- Single episode in the context of intermittent dizziness over several months  - Ddx: Vasovagal, orthostatic hypotension, cardiac structural abnormality, intracranial structural abnormality  - Neurology consulted, recommends CTA head and neck, MR brain  - F/u orthostatic vital signs  - F/u TTE to assess for cardiac structural abnormality  - Fall precautions  - Monitor for reoccurrence - Single episode in the context of intermittent dizziness over several months  - Ddx: Vasovagal, orthostatic hypotension, cardiac structural abnormality, intracranial structural abnormality  - CTA head/neck, MR brain negative  - Orthostatic vitals negative  - F/u TTE to assess for cardiac structural abnormality; will likely require cardiology consult  - Fall precautions  - Monitor for reoccurrence - Single episode in the context of intermittent dizziness over several months  - Ddx: Vasovagal, orthostatic hypotension, cardiac structural abnormality, intracranial structural abnormality  - CTA head/neck, MR brain negative  - Orthostatic vitals negative  - TTE unremarkable  - Fall precautions  - Monitor for reoccurrence  - Tentative d/c 6/25 with close PMD follow-up

## 2020-06-25 NOTE — CHART NOTE - NSCHARTNOTEFT_GEN_A_CORE
No major structural abnormalities on review of ECHO. Grossly preserved EF. Full report pending but no need for delay in discharge.     Navid Shoemaker MD, MPH, SHELBIE, RPVI, FACC  Cardiovascular Specialist   [facility]  c: [phone] (text preferred and/or call)  e: [email or web address]

## 2020-06-25 NOTE — DISCHARGE NOTE NURSING/CASE MANAGEMENT/SOCIAL WORK - PATIENT PORTAL LINK FT
You can access the FollowMyHealth Patient Portal offered by [facility] by registering at the following website: [email or web address]. By joining [employer]’s FollowMyHealth portal, you will also be able to view your health information using other applications (apps) compatible with our system.

## 2020-06-25 NOTE — PROGRESS NOTE ADULT - PROBLEM SELECTOR PLAN 1
- Single near syncope episode in the context of intermittent dizziness over several months  - Ddx: Vasovagal, orthostatic hypotension, arrhythmia, cardiac structural abnormality, intracranial structural abnormality  - Normal sinus 54-70, occasional PVC overnight on tele.  - negative finding on CTA head and neck, f/u MRI brain as neurology consulted   - F/u orthostatic vital signs  - F/u TTE to assess for cardiac structural abnormality  - Fall precautions  - Monitor for reoccurrence - Single near syncope episode in the context of intermittent dizziness over several months  - Ddx: Vasovagal, orthostatic hypotension, arrhythmia, cardiac structural abnormality, intracranial structural abnormality  - Normal sinus 54-70, occasional PVC overnight on tele.   - F/u TTE to assess for cardiac structural abnormality  - negative finding on CTA head and neck, f/u MRI brain as neurology consulted   - Orthostatic vital signs: Lying 131/73; sitting 126/73; standing 129/84  - Fall precautions  - Monitor for reoccurrence

## 2020-06-25 NOTE — PROGRESS NOTE ADULT - ASSESSMENT
75M PMHx HTN, HLD presents with near-syncope in the setting of intermittent dizziness, found to have EKG changes at PMD's office. Assessment and Plan:   · Assessment		  75M PMHx HTN, HLD presents with near-syncope in the setting of intermittent dizziness, found to have EKG changes at PMD's office.      Problem/Plan - 1:  ·  Problem: Pre-syncope.  Plan: - Single near syncope episode in the context of intermittent dizziness over several months  - Ddx: Vasovagal, orthostatic hypotension, arrhythmia, cardiac structural abnormality, intracranial structural abnormality  - Normal sinus 54-70, occasional PVC overnight on tele.   - F/u TTE to assess for cardiac structural abnormality  - negative finding on CTA head and neck, f/u MRI brain as neurology consulted   - Orthostatic vital signs: Lying 131/73; sitting 126/73; standing 129/84  - Fall precautions  - Monitor for reoccurrence.      Problem/Plan - 2:  ·  Problem: Amnesia.  Plan: - Inability to recall presyncopal event  - DDx: intracranial structural abnormality, intoxication, infection, seizure  - Neurological imaging ordered per Neuro recs  - toxicology studies show negative findings  - Monitor for reoccurrence.      Problem/Plan - 3:  ·  Problem: Hyperbilirubinemia.  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      Problem/Plan - 4:  ·  Problem: Hypertension.  Plan: - On admission, -145  - Continue home ARB (per therapeutic exchange, losartan 100 equivalent to home dose of valsartan 160); will hold amlodipine pending orthostatics.      Problem/Plan - 5:  ·  Problem: HLD (hyperlipidemia).  Plan: - Continue home statin  - Will follow-up lipid panel in AM.      Problem/Plan - 6:  Problem: Need for prophylactic measure. Plan: DVT ppx: Lovenox  Diet: DASH/TLC  Disposition: Pending clinical course  Code Status: Discussion to be had.     Problem/Plan - 7:  ·  Problem: Discharge planning issues.  Plan: Transition of Care Status:  1. Name of PCP:  2. PCP contacted on admission: [  ] Y     [  ] N  3. PCP contacted at discharge: [  ] Y     [  ] N  4. Post-discharge appointment date and location:  5. Summary of handoff given to PCP:

## 2020-06-25 NOTE — PROGRESS NOTE ADULT - PROBLEM SELECTOR PLAN 4
- On admission, -145  - Continue home ARB (per therapeutic exchange, losartan 100 equivalent to home dose of valsartan 160); will hold amlodipine pending orthostatics - Stable  - Continue home ARB (per therapeutic exchange, losartan 100 equivalent to home dose of valsartan 160); will hold amlodipine pending orthostatics - Stable  - Continue home ARB (per therapeutic exchange, losartan 100 equivalent to home dose of valsartan 160)

## 2020-06-25 NOTE — PROGRESS NOTE ADULT - SUBJECTIVE AND OBJECTIVE BOX
PROGRESS NOTE:   Patient is a 75y old  Male who presents with a chief complaint of Near-syncope and EKG changes (25 Jun 2020 06:26)      SUBJECTIVE / OVERNIGHT EVENTS:  No acute interval events. Pt was examined bedside. Normal sinus 54 - 70, occasional PVC overnight on tele. Denies cp/SOB/n/v/d/abdominal pain/fevers/chills.    ADDITIONAL REVIEW OF SYSTEMS:    MEDICATIONS  (STANDING):  aspirin enteric coated 81 milliGRAM(s) Oral daily  atorvastatin 10 milliGRAM(s) Oral at bedtime  cholecalciferol 1000 Unit(s) Oral daily  enoxaparin Injectable 40 milliGRAM(s) SubCutaneous daily  losartan 100 milliGRAM(s) Oral daily    MEDICATIONS  (PRN):      CAPILLARY BLOOD GLUCOSE    I&O's Summary      PHYSICAL EXAM:  Vital Signs Last 24 Hrs  T(C): 36.7 (25 Jun 2020 05:01), Max: 36.8 (24 Jun 2020 14:04)  T(F): 98 (25 Jun 2020 05:01), Max: 98.3 (24 Jun 2020 14:04)  HR: 58 (25 Jun 2020 05:01) (54 - 71)  BP: 135/72 (25 Jun 2020 05:01) (135/72 - 159/80)  BP(mean): --  RR: 18 (25 Jun 2020 05:01) (18 - 19)  SpO2: 97% (25 Jun 2020 05:01) (95% - 98%)    CONSTITUTIONAL: NAD, well-developed  RESPIRATORY: Normal respiratory effort; lungs are clear to auscultation bilaterally  CARDIOVASCULAR: Regular rate and rhythm, normal S1 and S2, no murmur/rub/gallop; No lower extremity edema; Peripheral pulses are 2+ bilaterally  ABDOMEN: Nontender to palpation, normoactive bowel sounds, no rebound/guarding; No hepatosplenomegaly  MUSCLOSKELETAL: no clubbing or cyanosis of digits; no joint swelling or tenderness to palpation  PSYCH: A+O to person, place, and time; affect appropriate    LABS:                        13.9   6.47  )-----------( 159      ( 25 Jun 2020 06:36 )             40.8     06-25    141  |  107  |  28<H>  ----------------------------<  101<H>  4.0   |  25  |  1.00    Ca    8.9      25 Jun 2020 06:36  Phos  3.8     06-25  Mg     2.4     06-25    TPro  6.3  /  Alb  3.9  /  TBili  1.5<H>  /  DBili  x   /  AST  19  /  ALT  23  /  AlkPhos  45  06-25    RADIOLOGY & ADDITIONAL TESTS:  Results Reviewed:   Imaging Personally Reviewed:  Electrocardiogram Personally Reviewed:    COORDINATION OF CARE:  Care Discussed with Consultants/Other Providers [Y/N]:  Prior or Outpatient Records Reviewed [Y/N]: PROGRESS NOTE:   Patient is a 75y old  Male who presents with a chief complaint of Near-syncope and EKG changes (25 Jun 2020 06:26)      SUBJECTIVE / OVERNIGHT EVENTS:  No acute interval events. Pt was examined bedside. Normal sinus 54 - 70, occasional PVC overnight on tele. Denies cp/SOB/n/v/d/abdominal pain/fevers/chills. Scheduled for MRI and TTE    ADDITIONAL REVIEW OF SYSTEMS:    MEDICATIONS  (STANDING):  aspirin enteric coated 81 milliGRAM(s) Oral daily  atorvastatin 10 milliGRAM(s) Oral at bedtime  cholecalciferol 1000 Unit(s) Oral daily  enoxaparin Injectable 40 milliGRAM(s) SubCutaneous daily  losartan 100 milliGRAM(s) Oral daily    MEDICATIONS  (PRN):      CAPILLARY BLOOD GLUCOSE    I&O's Summary      PHYSICAL EXAM:  Vital Signs Last 24 Hrs  T(C): 36.7 (25 Jun 2020 05:01), Max: 36.8 (24 Jun 2020 14:04)  T(F): 98 (25 Jun 2020 05:01), Max: 98.3 (24 Jun 2020 14:04)  HR: 58 (25 Jun 2020 05:01) (54 - 71)  BP: 135/72 (25 Jun 2020 05:01) (135/72 - 159/80)  BP(mean): --  RR: 18 (25 Jun 2020 05:01) (18 - 19)  SpO2: 97% (25 Jun 2020 05:01) (95% - 98%)    CONSTITUTIONAL: NAD, well-developed  RESPIRATORY: Normal respiratory effort; lungs are clear to auscultation bilaterally  CARDIOVASCULAR: Regular rate and rhythm, normal S1 and S2, no murmur/rub/gallop; No lower extremity edema; Peripheral pulses are 2+ bilaterally  ABDOMEN: Nontender to palpation, normoactive bowel sounds, no rebound/guarding; No hepatosplenomegaly  MUSCLOSKELETAL: no clubbing or cyanosis of digits; no joint swelling or tenderness to palpation  PSYCH: A+O to person, place, and time; affect appropriate    LABS:                        13.9   6.47  )-----------( 159      ( 25 Jun 2020 06:36 )             40.8     06-25    141  |  107  |  28<H>  ----------------------------<  101<H>  4.0   |  25  |  1.00    Ca    8.9      25 Jun 2020 06:36  Phos  3.8     06-25  Mg     2.4     06-25    TPro  6.3  /  Alb  3.9  /  TBili  1.5<H>  /  DBili  x   /  AST  19  /  ALT  23  /  AlkPhos  45  06-25    RADIOLOGY & ADDITIONAL TESTS:  Results Reviewed:   Imaging Personally Reviewed:  Electrocardiogram Personally Reviewed:    COORDINATION OF CARE:  Care Discussed with Consultants/Other Providers [Y/N]:  Prior or Outpatient Records Reviewed [Y/N]: PROGRESS NOTE:   Patient is a 75y old  Male who presents with a chief complaint of Near-syncope and EKG changes (25 Jun 2020 06:26)      SUBJECTIVE / OVERNIGHT EVENTS:  No acute interval events. Pt was examined bedside. Normal sinus 54 - 70, occasional PVC overnight on tele. Denies cp/SOB/n/v/d/abdominal pain/fevers/chills. Scheduled for MRI and TTE on 6/25    ADDITIONAL REVIEW OF SYSTEMS: Denies fevers, chills, palpitations     MEDICATIONS  (STANDING):  aspirin enteric coated 81 milliGRAM(s) Oral daily  atorvastatin 10 milliGRAM(s) Oral at bedtime  cholecalciferol 1000 Unit(s) Oral daily  enoxaparin Injectable 40 milliGRAM(s) SubCutaneous daily  losartan 100 milliGRAM(s) Oral daily    MEDICATIONS  (PRN):      CAPILLARY BLOOD GLUCOSE    I&O's Summary      PHYSICAL EXAM:  Vital Signs Last 24 Hrs  T(C): 36.7 (25 Jun 2020 05:01), Max: 36.8 (24 Jun 2020 14:04)  T(F): 98 (25 Jun 2020 05:01), Max: 98.3 (24 Jun 2020 14:04)  HR: 58 (25 Jun 2020 05:01) (54 - 71)  BP: 135/72 (25 Jun 2020 05:01) (135/72 - 159/80)  BP(mean): --  RR: 18 (25 Jun 2020 05:01) (18 - 19)  SpO2: 97% (25 Jun 2020 05:01) (95% - 98%)    CONSTITUTIONAL: NAD, well-developed  RESPIRATORY: Normal respiratory effort; lungs are clear to auscultation bilaterally  CARDIOVASCULAR: Regular rate and rhythm, normal S1 and S2, no murmur/rub/gallop; No lower extremity edema; Peripheral pulses are 2+ bilaterally  ABDOMEN: Nontender to palpation, normoactive bowel sounds, no rebound/guarding; No hepatosplenomegaly  MUSCLOSKELETAL: no clubbing or cyanosis of digits; no joint swelling or tenderness to palpation  PSYCH: A+O to person, place, and time; affect appropriate    LABS:                        13.9   6.47  )-----------( 159      ( 25 Jun 2020 06:36 )             40.8     06-25    141  |  107  |  28<H>  ----------------------------<  101<H>  4.0   |  25  |  1.00    Ca    8.9      25 Jun 2020 06:36  Phos  3.8     06-25  Mg     2.4     06-25    TPro  6.3  /  Alb  3.9  /  TBili  1.5<H>  /  DBili  x   /  AST  19  /  ALT  23  /  AlkPhos  45  06-25    RADIOLOGY & ADDITIONAL TESTS:  Results Reviewed: CTA negative   Imaging Personally Reviewed:  Electrocardiogram Personally Reviewed:            COORDINATION OF CARE:  Care Discussed with Consultants/Other Providers [Y/N]:  Prior or Outpatient Records Reviewed [Y/N]:

## 2020-06-25 NOTE — PROGRESS NOTE ADULT - SUBJECTIVE AND OBJECTIVE BOX
Contact Information:  Nilesh Wills MD,  PGY-1, Internal Medicine  Pager: [phone] ([facility]) ///     IRVING MARQUEZ, MRN-[id number]    Patient is a 75y old  Male who presents with a chief complaint of Near-syncope and EKG changes (24 Jun 2020 19:38)      OVERNIGHT EVENTS:    SUBJECTIVE:    CONSTITUTIONAL: No weakness. No fatigue. No fever.  HEAD: No head trauma.   EYES: No vision changes.  ENT: No hearing changes or tinnitus. No ear pain. No changes in smell. No nasal congestion or discharge. No sore throat. No voice hoarseness.   NECK: No neck pain or stiffness. No lumps.  RESPIRATORY: No cough. No SOB. No wheezing. No hemoptysis.   CARDIOVASCULAR: No chest pain. No palpitations.   GASTROINTESTINAL: No dysphagia. No ABD pain. No distension. No constipation. No diarrhea. No pain with defecation. No hematemesis. No hematochezia or melena.  BACK: No back pain.  GENITOURINARY: No dysuria. No frequency or urgency. No hesitancy. No incontinence. No urinary retention. No suprapubic pain. No hematuria.  EXTREMITY: No swelling.  MUSCULOSKELETAL: No joint pain or swelling. No fractures. No stiffness.    SKIN: No rashes. No itching. No skin, hair, or nail changes.  NEUROLOGICAL: No weakness or paralysis. No lightheadedness or dizziness. No HA. No numbness or tingling.   PSYCHIATRIC: No depression.       OBJECTIVE:  Vital Signs Last 24 Hrs  T(C): 36.7 (25 Jun 2020 05:01), Max: 36.8 (24 Jun 2020 14:04)  T(F): 98 (25 Jun 2020 05:01), Max: 98.3 (24 Jun 2020 14:04)  HR: 58 (25 Jun 2020 05:01) (54 - 71)  BP: 135/72 (25 Jun 2020 05:01) (135/72 - 159/80)  BP(mean): --  RR: 18 (25 Jun 2020 05:01) (18 - 19)  SpO2: 97% (25 Jun 2020 05:01) (95% - 98%)  I&O's Summary      MEDICATIONS  (STANDING):  aspirin enteric coated 81 milliGRAM(s) Oral daily  atorvastatin 10 milliGRAM(s) Oral at bedtime  cholecalciferol 1000 Unit(s) Oral daily  enoxaparin Injectable 40 milliGRAM(s) SubCutaneous daily  losartan 100 milliGRAM(s) Oral daily    MEDICATIONS  (PRN):    Allergies    No Known Allergies    Intolerances        CONSTITUTIONAL: No acute distress. Awake and alert.  HEAD: No evidence of trauma. Structures WNL.  EYES: +PERRL. +EOMI. No scleral icterus. No conjunctival injection.  ENT: Moist oral mucosa. No erythema. No pharyngeal exudates.   NECK: Supple. Appropriate ROM. No stiffness. No masses or lymphadenopathy.  RESPIRATORY: CTAB. No wheezes, rales, or rhonchi. No accessory muscle use. No apparent respiratory distress.  CARDIOVASCULAR: +S1/S2. No audible S3/S4. Regular rate and rhythm. No murmurs, rubs, or gallops. 2+ radial pulses x b/l UE; 2+ DP pulses x b/l LE.   GASTROINTESTINAL: Soft, nontender, nondistended. +BS. No rebound or guarding.   BACK: No spinal or paraspinal tenderness. No CVA tenderness.  EXTREMITY: No LE swelling or edema. EXTs warm to touch.  MUSCULOSKELETAL: Spontaneous movement in all extremities.  DERMATOLOGICAL: No abnormal rashes or lesions.  NEUROLOGICAL: CN 2-12 grossly intact. No focal deficits. Sensation intact x 4EXT. A&Ox3 (oriented to person, place, and time).  PSYCHIATRIC: Appropriate affect.                            14.3   8.15  )-----------( 196      ( 24 Jun 2020 14:52 )             43.5       06-24    140  |  105  |  30<H>  ----------------------------<  105<H>  4.3   |  22  |  1.15    Ca    9.3      24 Jun 2020 14:52    TPro  6.7  /  Alb  4.5  /  TBili  1.8<H>  /  DBili  x   /  AST  24  /  ALT  24  /  AlkPhos  54  06-24    CAPILLARY BLOOD GLUCOSE        LIVER FUNCTIONS - ( 24 Jun 2020 14:52 )  Alb: 4.5 g/dL / Pro: 6.7 g/dL / ALK PHOS: 54 U/L / ALT: 24 U/L / AST: 24 U/L / GGT: x                       RADIOLOGY AND ADDITIONAL TESTS:    CONSULTANT NOTES REVIEWED:    CARE DISCUSSED WITH THE FOLLOWING CONSULTANTS/PROVIDERS: Contact Information:  Nilesh Wills MD,  PGY-1, Internal Medicine  Pager: [phone] ([facility]) ///     IRVING MARQUEZ, MRN-[id number]    Patient is a 75y old  Male who presents with a chief complaint of Near-syncope and EKG changes (24 Jun 2020 19:38)    Subjective:    No acute interval events. Pt was examined bedside. Normal sinus 54 - 70, occasional PVC overnight on tele. Denies cp/SOB/n/v/d/abdominal pain/fevers/chills.        CONSTITUTIONAL: No weakness. No fatigue. No fever.  HEAD: No head trauma.   EYES: No vision changes.  ENT: No hearing changes or tinnitus. No ear pain. No changes in smell. No nasal congestion or discharge. No sore throat. No voice hoarseness.   NECK: No neck pain or stiffness. No lumps.  RESPIRATORY: No cough. No SOB. No wheezing. No hemoptysis.   CARDIOVASCULAR: No chest pain. No palpitations.   GASTROINTESTINAL: No dysphagia. No ABD pain. No distension. No constipation. No diarrhea. No pain with defecation. No hematemesis. No hematochezia or melena.  BACK: No back pain.  GENITOURINARY: No dysuria. No frequency or urgency. No hesitancy. No incontinence. No urinary retention. No suprapubic pain. No hematuria.  EXTREMITY: No swelling.  MUSCULOSKELETAL: No joint pain or swelling. No fractures. No stiffness.    SKIN: No rashes. No itching. No skin, hair, or nail changes.  NEUROLOGICAL: No weakness or paralysis. No lightheadedness or dizziness. No HA. No numbness or tingling.   PSYCHIATRIC: No depression.       OBJECTIVE:  Vital Signs Last 24 Hrs  T(C): 36.7 (25 Jun 2020 05:01), Max: 36.8 (24 Jun 2020 14:04)  T(F): 98 (25 Jun 2020 05:01), Max: 98.3 (24 Jun 2020 14:04)  HR: 58 (25 Jun 2020 05:01) (54 - 71)  BP: 135/72 (25 Jun 2020 05:01) (135/72 - 159/80)  BP(mean): --  RR: 18 (25 Jun 2020 05:01) (18 - 19)  SpO2: 97% (25 Jun 2020 05:01) (95% - 98%)  I&O's Summary      MEDICATIONS  (STANDING):  aspirin enteric coated 81 milliGRAM(s) Oral daily  atorvastatin 10 milliGRAM(s) Oral at bedtime  cholecalciferol 1000 Unit(s) Oral daily  enoxaparin Injectable 40 milliGRAM(s) SubCutaneous daily  losartan 100 milliGRAM(s) Oral daily    MEDICATIONS  (PRN):    Allergies    No Known Allergies    Intolerances        CONSTITUTIONAL: No acute distress. Awake and alert.  HEAD: No evidence of trauma. Structures WNL.  EYES: +PERRL. +EOMI. No scleral icterus. No conjunctival injection.  ENT: Moist oral mucosa. No erythema. No pharyngeal exudates.   NECK: Supple. Appropriate ROM. No stiffness. No masses or lymphadenopathy.  RESPIRATORY: CTAB. No wheezes, rales, or rhonchi. No accessory muscle use. No apparent respiratory distress.  CARDIOVASCULAR: +S1/S2. No audible S3/S4. Regular rate and rhythm. No murmurs, rubs, or gallops. 2+ radial pulses x b/l UE; 2+ DP pulses x b/l LE.   GASTROINTESTINAL: Soft, nontender, nondistended. +BS. No rebound or guarding.   BACK: No spinal or paraspinal tenderness. No CVA tenderness.  EXTREMITY: No LE swelling or edema. EXTs warm to touch.  MUSCULOSKELETAL: Spontaneous movement in all extremities.  DERMATOLOGICAL: No abnormal rashes or lesions.  NEUROLOGICAL: CN 2-12 grossly intact. No focal deficits. Sensation intact x 4EXT. A&Ox3 (oriented to person, place, and time).  PSYCHIATRIC: Appropriate affect.                            14.3   8.15  )-----------( 196      ( 24 Jun 2020 14:52 )             43.5       06-24    140  |  105  |  30<H>  ----------------------------<  105<H>  4.3   |  22  |  1.15    Ca    9.3      24 Jun 2020 14:52    TPro  6.7  /  Alb  4.5  /  TBili  1.8<H>  /  DBili  x   /  AST  24  /  ALT  24  /  AlkPhos  54  06-24    CAPILLARY BLOOD GLUCOSE        LIVER FUNCTIONS - ( 24 Jun 2020 14:52 )  Alb: 4.5 g/dL / Pro: 6.7 g/dL / ALK PHOS: 54 U/L / ALT: 24 U/L / AST: 24 U/L / GGT: x                       RADIOLOGY AND ADDITIONAL TESTS:    CONSULTANT NOTES REVIEWED:    CARE DISCUSSED WITH THE FOLLOWING CONSULTANTS/PROVIDERS: Contact Information:  Nilesh Wills MD,  PGY-1, Internal Medicine  Pager: [phone] ([facility]) ///     IRVING MARQUEZ, MRN-[id number]    Patient is a 75y old  Male who presents with a chief complaint of Near-syncope and EKG changes (24 Jun 2020 19:38)    Subjective:    No acute interval events. Pt was examined bedside. Normal sinus 54 - 70, occasional PVC overnight on tele. Denies cp/SOB/n/v/d/abdominal pain/fevers/chills.      OBJECTIVE:  Vital Signs Last 24 Hrs  T(C): 36.7 (25 Jun 2020 05:01), Max: 36.8 (24 Jun 2020 14:04)  T(F): 98 (25 Jun 2020 05:01), Max: 98.3 (24 Jun 2020 14:04)  HR: 58 (25 Jun 2020 05:01) (54 - 71)  BP: 135/72 (25 Jun 2020 05:01) (135/72 - 159/80)  BP(mean): --  RR: 18 (25 Jun 2020 05:01) (18 - 19)  SpO2: 97% (25 Jun 2020 05:01) (95% - 98%)  I&O's Summary      MEDICATIONS  (STANDING):  aspirin enteric coated 81 milliGRAM(s) Oral daily  atorvastatin 10 milliGRAM(s) Oral at bedtime  cholecalciferol 1000 Unit(s) Oral daily  enoxaparin Injectable 40 milliGRAM(s) SubCutaneous daily  losartan 100 milliGRAM(s) Oral daily    MEDICATIONS  (PRN):    Allergies    No Known Allergies    Intolerances        CONSTITUTIONAL: No acute distress. Awake and alert.  EYES: No scleral icterus. No conjunctival injection.  ENT: Moist oral mucosa. No erythema. No pharyngeal exudates.   NECK: Supple. Appropriate ROM. No stiffness. No masses or lymphadenopathy.  RESPIRATORY: CTAB. No accessory muscle use. No apparent respiratory distress.  CARDIOVASCULAR: +S1/S2. No audible S3/S4. Regular rate and rhythm. No murmurs, rubs, or gallops. 2+ radial pulses x b/l UE; 2+ DP pulses x b/l LE.   GASTROINTESTINAL: Soft, nontender, nondistended. +BS. No rebound or guarding.   EXTREMITY: No LE swelling or edema. EXTs warm to touch.  MUSCULOSKELETAL: Spontaneous movement in all extremities.  NEUROLOGICAL: Nonfocal.                            14.3   8.15  )-----------( 196      ( 24 Jun 2020 14:52 )             43.5       06-24    140  |  105  |  30<H>  ----------------------------<  105<H>  4.3   |  22  |  1.15    Ca    9.3      24 Jun 2020 14:52    TPro  6.7  /  Alb  4.5  /  TBili  1.8<H>  /  DBili  x   /  AST  24  /  ALT  24  /  AlkPhos  54  06-24    CAPILLARY BLOOD GLUCOSE        LIVER FUNCTIONS - ( 24 Jun 2020 14:52 )  Alb: 4.5 g/dL / Pro: 6.7 g/dL / ALK PHOS: 54 U/L / ALT: 24 U/L / AST: 24 U/L / GGT: x                       RADIOLOGY AND ADDITIONAL TESTS:    CONSULTANT NOTES REVIEWED:    CARE DISCUSSED WITH THE FOLLOWING CONSULTANTS/PROVIDERS: Contact Information:  Nilesh Wills MD,  PGY-1, Internal Medicine  Pager: [phone] ([facility]) ///     IRVING MARQUEZ, MRN-[id number]    Patient is a 75y old  Male who presents with a chief complaint of Near-syncope and EKG changes (24 Jun 2020 19:38)    OVERNIGHT EVENTS: No overnight events.    SUBJECTIVE: Pt was examined at bedside. No acute complaints. Denies fever, N/V, lightheadedness, dizziness, SOB, CP/ABD pain, diarrhea, weakness.    ROS negative.    OBJECTIVE:  Vital Signs Last 24 Hrs  T(C): 36.7 (25 Jun 2020 05:01), Max: 36.8 (24 Jun 2020 14:04)  T(F): 98 (25 Jun 2020 05:01), Max: 98.3 (24 Jun 2020 14:04)  HR: 58 (25 Jun 2020 05:01) (54 - 71)  BP: 135/72 (25 Jun 2020 05:01) (135/72 - 159/80)  BP(mean): --  RR: 18 (25 Jun 2020 05:01) (18 - 19)  SpO2: 97% (25 Jun 2020 05:01) (95% - 98%)  I&O's Summary      MEDICATIONS  (STANDING):  aspirin enteric coated 81 milliGRAM(s) Oral daily  atorvastatin 10 milliGRAM(s) Oral at bedtime  cholecalciferol 1000 Unit(s) Oral daily  enoxaparin Injectable 40 milliGRAM(s) SubCutaneous daily  losartan 100 milliGRAM(s) Oral daily    MEDICATIONS  (PRN):    Allergies    No Known Allergies    Intolerances        CONSTITUTIONAL: No acute distress. Awake and alert.  EYES: No scleral icterus. No conjunctival injection.  ENT: Moist oral mucosa. No erythema. No pharyngeal exudates.   NECK: Supple. Appropriate ROM. No stiffness. No masses or lymphadenopathy.  RESPIRATORY: CTAB. No accessory muscle use. No apparent respiratory distress.  CARDIOVASCULAR: +S1/S2. No audible S3/S4. Regular rate and rhythm. No murmurs, rubs, or gallops. 2+ radial pulses x b/l UE; 2+ DP pulses x b/l LE.   GASTROINTESTINAL: Soft, nontender, nondistended. +BS. No rebound or guarding.   EXTREMITY: No LE swelling or edema. EXTs warm to touch.  MUSCULOSKELETAL: Spontaneous movement in all extremities.  NEUROLOGICAL: Nonfocal.                            14.3   8.15  )-----------( 196      ( 24 Jun 2020 14:52 )             43.5       06-24    140  |  105  |  30<H>  ----------------------------<  105<H>  4.3   |  22  |  1.15    Ca    9.3      24 Jun 2020 14:52    TPro  6.7  /  Alb  4.5  /  TBili  1.8<H>  /  DBili  x   /  AST  24  /  ALT  24  /  AlkPhos  54  06-24    CAPILLARY BLOOD GLUCOSE        LIVER FUNCTIONS - ( 24 Jun 2020 14:52 )  Alb: 4.5 g/dL / Pro: 6.7 g/dL / ALK PHOS: 54 U/L / ALT: 24 U/L / AST: 24 U/L / GGT: x                       RADIOLOGY AND ADDITIONAL TESTS:    CONSULTANT NOTES REVIEWED:    CARE DISCUSSED WITH THE FOLLOWING CONSULTANTS/PROVIDERS:

## 2020-06-25 NOTE — PROGRESS NOTE ADULT - PROBLEM SELECTOR PLAN 4
- On admission, -145  - Continue home ARB (per therapeutic exchange, losartan 100 equivalent to home dose of valsartan 160); will hold amlodipine pending orthostatics

## 2020-06-25 NOTE — PROGRESS NOTE ADULT - PROBLEM SELECTOR PLAN 3
- On admission, total bilirubin 1.8; on chart review, baseline 1.6-2.3  - Asymptomatic with no complaints  - Likely manifestation of Gilbert's syndrome  - Will monitor CMP - Asymptomatic with no complaints  - Likely manifestation of Gilbert's syndrome  - Will monitor CMP

## 2020-06-25 NOTE — PROGRESS NOTE ADULT - ASSESSMENT
75M PMHx HTN, HLD presents with near-syncope in the setting of intermittent dizziness, found to have EKG changes at PMD's office. 75M PMHx HTN, HLD presents with near-syncope in the setting of intermittent dizziness, found to have EKG changes at PMD's office. Currently on telemetry. Neurological imaging negative thus far.

## 2020-06-25 NOTE — PROGRESS NOTE ADULT - PROBLEM SELECTOR PLAN 2
- Inability to recall presyncopal event  - DDx: intracranial structural abnormality, intoxication, infection, seizure  - Neurological imaging ordered per Neuro recs  - toxicology studies show negative findings  - Monitor for reoccurrence

## 2020-06-25 NOTE — PROGRESS NOTE ADULT - PROBLEM SELECTOR PLAN 3
- On admission, total bilirubin 1.8; on chart review, baseline 1.6-2.3  - Asymptomatic with no complaints  - Likely manifestation of Gilbert's syndrome  - Will monitor CMP

## 2020-06-25 NOTE — PROGRESS NOTE ADULT - PROBLEM SELECTOR PLAN 2
- Inability to recall presyncopal event  - DDx: intracranial structural abnormality, intoxication, infection  - Neurological imaging ordered per Neuro recs  - F/u toxicology studies  - Monitor for reoccurrence - Inability to recall presyncopal event  - DDx: intracranial structural abnormality, intoxication, infection  - Neurological imaging ordered per Neuro recs  - Toxicology studies negative  - Monitor for reoccurrence - Inability to recall presyncopal event  - DDx: intracranial structural abnormality, intoxication, infection  - Neurological, toxicology, cardiac workup, orthostatic negative  - Monitor for reoccurrence

## 2020-06-25 NOTE — PROGRESS NOTE ADULT - PROBLEM SELECTOR PLAN 6
DVT ppx: Lovenox  Diet: DASH/TLC  Disposition: Pending clinical course  Code Status: Discussion to be had

## 2020-06-26 VITALS
TEMPERATURE: 98 F | DIASTOLIC BLOOD PRESSURE: 62 MMHG | RESPIRATION RATE: 18 BRPM | HEART RATE: 60 BPM | OXYGEN SATURATION: 95 % | SYSTOLIC BLOOD PRESSURE: 125 MMHG

## 2020-06-26 LAB
ALBUMIN SERPL ELPH-MCNC: 3.9 G/DL — SIGNIFICANT CHANGE UP (ref 3.3–5)
ALP SERPL-CCNC: 42 U/L — SIGNIFICANT CHANGE UP (ref 40–120)
ALT FLD-CCNC: 22 U/L — SIGNIFICANT CHANGE UP (ref 10–45)
ANION GAP SERPL CALC-SCNC: 9 MMOL/L — SIGNIFICANT CHANGE UP (ref 5–17)
AST SERPL-CCNC: 17 U/L — SIGNIFICANT CHANGE UP (ref 10–40)
BILIRUB SERPL-MCNC: 1.6 MG/DL — HIGH (ref 0.2–1.2)
BUN SERPL-MCNC: 26 MG/DL — HIGH (ref 7–23)
CALCIUM SERPL-MCNC: 9.1 MG/DL — SIGNIFICANT CHANGE UP (ref 8.4–10.5)
CHLORIDE SERPL-SCNC: 106 MMOL/L — SIGNIFICANT CHANGE UP (ref 96–108)
CO2 SERPL-SCNC: 24 MMOL/L — SIGNIFICANT CHANGE UP (ref 22–31)
CREAT SERPL-MCNC: 1.2 MG/DL — SIGNIFICANT CHANGE UP (ref 0.5–1.3)
GLUCOSE SERPL-MCNC: 106 MG/DL — HIGH (ref 70–99)
HCT VFR BLD CALC: 42.6 % — SIGNIFICANT CHANGE UP (ref 39–50)
HGB BLD-MCNC: 14.1 G/DL — SIGNIFICANT CHANGE UP (ref 13–17)
MAGNESIUM SERPL-MCNC: 2.3 MG/DL — SIGNIFICANT CHANGE UP (ref 1.6–2.6)
MCHC RBC-ENTMCNC: 29.3 PG — SIGNIFICANT CHANGE UP (ref 27–34)
MCHC RBC-ENTMCNC: 33.1 GM/DL — SIGNIFICANT CHANGE UP (ref 32–36)
MCV RBC AUTO: 88.6 FL — SIGNIFICANT CHANGE UP (ref 80–100)
NRBC # BLD: 0 /100 WBCS — SIGNIFICANT CHANGE UP (ref 0–0)
PHOSPHATE SERPL-MCNC: 3.5 MG/DL — SIGNIFICANT CHANGE UP (ref 2.5–4.5)
PLATELET # BLD AUTO: 180 K/UL — SIGNIFICANT CHANGE UP (ref 150–400)
POTASSIUM SERPL-MCNC: 4.2 MMOL/L — SIGNIFICANT CHANGE UP (ref 3.5–5.3)
POTASSIUM SERPL-SCNC: 4.2 MMOL/L — SIGNIFICANT CHANGE UP (ref 3.5–5.3)
PROT SERPL-MCNC: 6.2 G/DL — SIGNIFICANT CHANGE UP (ref 6–8.3)
RBC # BLD: 4.81 M/UL — SIGNIFICANT CHANGE UP (ref 4.2–5.8)
RBC # FLD: 13.9 % — SIGNIFICANT CHANGE UP (ref 10.3–14.5)
SODIUM SERPL-SCNC: 139 MMOL/L — SIGNIFICANT CHANGE UP (ref 135–145)
WBC # BLD: 6.25 K/UL — SIGNIFICANT CHANGE UP (ref 3.8–10.5)
WBC # FLD AUTO: 6.25 K/UL — SIGNIFICANT CHANGE UP (ref 3.8–10.5)

## 2020-06-26 PROCEDURE — 70553 MRI BRAIN STEM W/O & W/DYE: CPT

## 2020-06-26 PROCEDURE — 70496 CT ANGIOGRAPHY HEAD: CPT

## 2020-06-26 PROCEDURE — 83735 ASSAY OF MAGNESIUM: CPT

## 2020-06-26 PROCEDURE — A9585: CPT

## 2020-06-26 PROCEDURE — 84484 ASSAY OF TROPONIN QUANT: CPT

## 2020-06-26 PROCEDURE — 70498 CT ANGIOGRAPHY NECK: CPT

## 2020-06-26 PROCEDURE — 99285 EMERGENCY DEPT VISIT HI MDM: CPT | Mod: 25

## 2020-06-26 PROCEDURE — 80053 COMPREHEN METABOLIC PANEL: CPT

## 2020-06-26 PROCEDURE — 80061 LIPID PANEL: CPT

## 2020-06-26 PROCEDURE — 85027 COMPLETE CBC AUTOMATED: CPT

## 2020-06-26 PROCEDURE — 93306 TTE W/DOPPLER COMPLETE: CPT

## 2020-06-26 PROCEDURE — 93005 ELECTROCARDIOGRAM TRACING: CPT

## 2020-06-26 PROCEDURE — 99239 HOSP IP/OBS DSCHRG MGMT >30: CPT | Mod: GC

## 2020-06-26 PROCEDURE — 84100 ASSAY OF PHOSPHORUS: CPT

## 2020-06-26 PROCEDURE — 71046 X-RAY EXAM CHEST 2 VIEWS: CPT

## 2020-06-26 PROCEDURE — 80307 DRUG TEST PRSMV CHEM ANLYZR: CPT

## 2020-06-26 RX ORDER — ATORVASTATIN CALCIUM 80 MG/1
1 TABLET, FILM COATED ORAL
Qty: 0 | Refills: 0 | DISCHARGE
Start: 2020-06-26

## 2020-06-26 RX ORDER — CHOLECALCIFEROL (VITAMIN D3) 125 MCG
0 CAPSULE ORAL
Qty: 0 | Refills: 0 | DISCHARGE

## 2020-06-26 RX ORDER — CHOLECALCIFEROL (VITAMIN D3) 125 MCG
1000 CAPSULE ORAL
Qty: 0 | Refills: 0 | DISCHARGE
Start: 2020-06-26

## 2020-06-26 RX ORDER — ASPIRIN/CALCIUM CARB/MAGNESIUM 324 MG
1 TABLET ORAL
Qty: 0 | Refills: 0 | DISCHARGE

## 2020-06-26 RX ORDER — ASPIRIN/CALCIUM CARB/MAGNESIUM 324 MG
1 TABLET ORAL
Qty: 0 | Refills: 0 | DISCHARGE
Start: 2020-06-26

## 2020-06-26 RX ORDER — ATORVASTATIN CALCIUM 80 MG/1
1 TABLET, FILM COATED ORAL
Qty: 0 | Refills: 0 | DISCHARGE

## 2020-06-26 RX ADMIN — LOSARTAN POTASSIUM 100 MILLIGRAM(S): 100 TABLET, FILM COATED ORAL at 06:03

## 2020-06-26 RX ADMIN — ENOXAPARIN SODIUM 40 MILLIGRAM(S): 100 INJECTION SUBCUTANEOUS at 12:19

## 2020-06-26 RX ADMIN — Medication 1000 UNIT(S): at 12:19

## 2020-06-26 RX ADMIN — Medication 81 MILLIGRAM(S): at 12:19

## 2020-06-26 NOTE — PROGRESS NOTE ADULT - SUBJECTIVE AND OBJECTIVE BOX
Contact Information:  Nilesh Wills MD,  PGY-1, Internal Medicine  Pager: [phone] ([facility]) ///     LAURAIRVING WILL, MRN-[id number]    Patient is a 75y old  Male who presents with a chief complaint of Near-syncope and EKG changes (25 Jun 2020 08:12)      OVERNIGHT EVENTS: No acute ove events rnight.    REVIEW OF SYSTEMS: Denies fevers, chills, palpitations     SUBJECTIVE:          OBJECTIVE:  Vital Signs Last 24 Hrs  T(C): 36.7 (26 Jun 2020 04:31), Max: 36.8 (25 Jun 2020 20:30)  T(F): 98.1 (26 Jun 2020 04:31), Max: 98.3 (26 Jun 2020 02:20)  HR: 55 (26 Jun 2020 04:31) (50 - 67)  BP: 139/82 (26 Jun 2020 04:31) (113/64 - 149/81)  BP(mean): 3 (26 Jun 2020 04:31) (3 - 3)  RR: 18 (26 Jun 2020 04:31) (18 - 18)  SpO2: 97% (26 Jun 2020 04:31) (95% - 97%)  I&O's Summary    25 Jun 2020 07:01  -  26 Jun 2020 06:30  --------------------------------------------------------  IN: 500 mL / OUT: 0 mL / NET: 500 mL    PE:  CONSTITUTIONAL: NAD, well-developed  RESPIRATORY: Normal respiratory effort; lungs are clear to auscultation bilaterally  CARDIOVASCULAR: Regular rate and rhythm, normal S1 and S2, no murmur/rub/gallop; No lower extremity edema; Peripheral pulses are 2+ bilaterally  ABDOMEN: Nontender to palpation, normoactive bowel sounds, no rebound/guarding; No hepatosplenomegaly  MUSCLOSKELETAL: no clubbing or cyanosis of digits; no joint swelling or tenderness to palpation  PSYCH: A+O to person, place, and time; affect appropriate        MEDICATIONS  (STANDING):  aspirin enteric coated 81 milliGRAM(s) Oral daily  atorvastatin 10 milliGRAM(s) Oral at bedtime  cholecalciferol 1000 Unit(s) Oral daily  enoxaparin Injectable 40 milliGRAM(s) SubCutaneous daily  losartan 100 milliGRAM(s) Oral daily    MEDICATIONS  (PRN):    Allergies    No Known Allergies    Intolerances                                14.1   6.25  )-----------( 180      ( 26 Jun 2020 05:22 )             42.6       06-26    139  |  106  |  26<H>  ----------------------------<  106<H>  4.2   |  24  |  1.20    Ca    9.1      26 Jun 2020 05:22  Phos  3.5     06-26  Mg     2.3     06-26    TPro  6.2  /  Alb  3.9  /  TBili  1.6<H>  /  DBili  x   /  AST  17  /  ALT  22  /  AlkPhos  42  06-26    CAPILLARY BLOOD GLUCOSE        LIVER FUNCTIONS - ( 26 Jun 2020 05:22 )  Alb: 3.9 g/dL / Pro: 6.2 g/dL / ALK PHOS: 42 U/L / ALT: 22 U/L / AST: 17 U/L / GGT: x                       RADIOLOGY AND ADDITIONAL TESTS:    CONSULTANT NOTES REVIEWED:    CARE DISCUSSED WITH THE FOLLOWING CONSULTANTS/PROVIDERS: Contact Information:  Nilesh Wills MD,  PGY-1, Internal Medicine  Pager: [phone] ([facility]) ///     LAURAIRVING WILL, MRN-[id number]    Patient is a 75y old  Male who presents with a chief complaint of Near-syncope and EKG changes (25 Jun 2020 08:12)      OVERNIGHT EVENTS: No acute over events overnight. Nothing remarkable on MRI, TTE, or tele over night.      SUBJECTIVE:  : Denies fevers, chills, palpitations, chest pain, abdominal pain. Discharge today, patient will follow up with PCP and cardiologist               OBJECTIVE:  Vital Signs Last 24 Hrs  T(C): 36.7 (26 Jun 2020 04:31), Max: 36.8 (25 Jun 2020 20:30)  T(F): 98.1 (26 Jun 2020 04:31), Max: 98.3 (26 Jun 2020 02:20)  HR: 55 (26 Jun 2020 04:31) (50 - 67)  BP: 139/82 (26 Jun 2020 04:31) (113/64 - 149/81)  BP(mean): 3 (26 Jun 2020 04:31) (3 - 3)  RR: 18 (26 Jun 2020 04:31) (18 - 18)  SpO2: 97% (26 Jun 2020 04:31) (95% - 97%)  I&O's Summary    25 Jun 2020 07:01  -  26 Jun 2020 06:30  --------------------------------------------------------  IN: 500 mL / OUT: 0 mL / NET: 500 mL    PE:  CONSTITUTIONAL: NAD, well-developed  RESPIRATORY: Normal respiratory effort; lungs are clear to auscultation bilaterally  CARDIOVASCULAR: Regular rate and rhythm, normal S1 and S2, no murmur/rub/gallop; No lower extremity edema; Peripheral pulses are 2+ bilaterally  ABDOMEN: Nontender to palpation, normoactive bowel sounds, no rebound/guarding; No hepatosplenomegaly  MUSCLOSKELETAL: no clubbing or cyanosis of digits; no joint swelling or tenderness to palpation  PSYCH: A+O to person, place, and time; affect appropriate        MEDICATIONS  (STANDING):  aspirin enteric coated 81 milliGRAM(s) Oral daily  atorvastatin 10 milliGRAM(s) Oral at bedtime  cholecalciferol 1000 Unit(s) Oral daily  enoxaparin Injectable 40 milliGRAM(s) SubCutaneous daily  losartan 100 milliGRAM(s) Oral daily    MEDICATIONS  (PRN):    Allergies    No Known Allergies    Intolerances                                14.1   6.25  )-----------( 180      ( 26 Jun 2020 05:22 )             42.6       06-26    139  |  106  |  26<H>  ----------------------------<  106<H>  4.2   |  24  |  1.20    Ca    9.1      26 Jun 2020 05:22  Phos  3.5     06-26  Mg     2.3     06-26    TPro  6.2  /  Alb  3.9  /  TBili  1.6<H>  /  DBili  x   /  AST  17  /  ALT  22  /  AlkPhos  42  06-26    CAPILLARY BLOOD GLUCOSE        LIVER FUNCTIONS - ( 26 Jun 2020 05:22 )  Alb: 3.9 g/dL / Pro: 6.2 g/dL / ALK PHOS: 42 U/L / ALT: 22 U/L / AST: 17 U/L / GGT: x                       RADIOLOGY AND ADDITIONAL TESTS:    CONSULTANT NOTES REVIEWED:    CARE DISCUSSED WITH THE FOLLOWING CONSULTANTS/PROVIDERS:

## 2020-06-26 NOTE — PROGRESS NOTE ADULT - PROBLEM SELECTOR PLAN 1
- Single near syncope episode in the context of intermittent dizziness over several months  - Ddx: Vasovagal, orthostatic hypotension, arrhythmia, cardiac structural abnormality, intracranial structural abnormality  - Normal sinus 54-70, occasional PVC overnight on tele.   - F/u TTE to assess for cardiac structural abnormality  - negative finding on CTA head and neck, f/u MRI brain as neurology consulted   - Orthostatic vital signs: Lying 131/73; sitting 126/73; standing 129/84  - Fall precautions  - Monitor for reoccurrence

## 2020-06-26 NOTE — PROGRESS NOTE ADULT - ATTENDING COMMENTS
Patient seen and examined with resident and team. I agree with the above findings unless noted below. No events or Arrhyhtmia on tele patients feels fine No dizziness, No CP, No SOB   HD stable, exam unchanged   CT head CTA head and neck  - non diagnostic  MR brain without any acute findings   EKG, June 25  without acute ST T change abnormality   Lipid profile - HDL 75  LDL -73   TTE with normal LV   unclear etiology of the presyncopal episode/ Amnesia   no further in-patient cardiac work up is needed at this time   Out patient follow up with PMD  d/w patient PMD, Dr. Chintan Church   d/w patient, he understands to followup with his cardiologist   will resume his home medications and DC planning   Discharge time 40 minutes
Patient seen and examined in AM, case d/w resident and team. I agree with the above findings unless noted below. noted to have PVCs on tele.   Samra are negative no chest pain or palpitation   76 Y/O/M  with PMHx of HTN, HLD brought to the hospital after an episode of near syncope and EKG abnormality.   Ruled out for MI   MRI brain is non diagnostic   TTE prelim with nl LV   will d/w his PMD   likely DC planning  in next 24 hours

## 2020-06-26 NOTE — PROGRESS NOTE ADULT - PROBLEM SELECTOR PLAN 1
- Single near syncope episode in the context of intermittent dizziness over several months  - Ddx: Vasovagal, orthostatic hypotension, arrhythmia, cardiac structural abnormality, intracranial structural abnormality  - Normal sinus 50-60, no PVC overnight on tele.   - TTE: no major structural abnormalities, preserved EF  - negative finding on CTA head and neck and MRI brain as neurology consulted   - Orthostatic vital signs: Lying 131/73; sitting 126/73; standing 129/84  - Fall precautions  - Monitor for reoccurrence - Single near syncope episode in the context of intermittent dizziness over several months  - Ddx: Vasovagal, orthostatic hypotension, arrhythmia, cardiac structural abnormality, intracranial structural abnormality  - Normal sinus 50-60, no PVC overnight on tele.   - TTE: no major structural abnormalities, preserved EF  - negative finding on CTA head and neck and MRI brain as neurology consulted   - Orthostatic vital signs: supine 131/73; sitting 126/73; standing 129/84  - pending discharge   - Fall precautions  - Monitor for reoccurrence

## 2020-06-26 NOTE — PROGRESS NOTE ADULT - ASSESSMENT
Assessment and Plan:   · Assessment		  75M PMHx HTN, HLD presents with near-syncope in the setting of intermittent dizziness, found to have EKG changes at PMD's office.      Problem/Plan - 1:  ·  Problem: Pre-syncope.  Plan: - Single near syncope episode in the context of intermittent dizziness over several months  - Ddx: Vasovagal, orthostatic hypotension, arrhythmia, cardiac structural abnormality, intracranial structural abnormality  - Normal sinus 54-70, occasional PVC overnight on tele.   - F/u TTE to assess for cardiac structural abnormality  - negative finding on CTA head and neck, f/u MRI brain as neurology consulted   - Orthostatic vital signs: Lying 131/73; sitting 126/73; standing 129/84  - Fall precautions  - Monitor for reoccurrence.      Problem/Plan - 2:  ·  Problem: Amnesia.  Plan: - Inability to recall presyncopal event  - DDx: intracranial structural abnormality, intoxication, infection, seizure  - Neurological imaging ordered per Neuro recs  - toxicology studies show negative findings  - Monitor for reoccurrence.      Problem/Plan - 3:  ·  Problem: Hyperbilirubinemia.  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      Problem/Plan - 4:  ·  Problem: Hypertension.  Plan: - On admission, -145  - Continue home ARB (per therapeutic exchange, losartan 100 equivalent to home dose of valsartan 160); will hold amlodipine pending orthostatics.      Problem/Plan - 5:  ·  Problem: HLD (hyperlipidemia).  Plan: - Continue home statin  - Will follow-up lipid panel in AM.      Problem/Plan - 6:  Problem: Need for prophylactic measure. Plan: DVT ppx: Lovenox  Diet: DASH/TLC  Disposition: Pending clinical course  Code Status: Discussion to be had.     Problem/Plan - 7:  ·  Problem: Discharge planning issues.  Plan: Transition of Care Status:  1. Name of PCP:  2. PCP contacted on admission: [  ] Y     [  ] N  3. PCP contacted at discharge: [  ] Y     [  ] N  4. Post-discharge appointment date and location:  5. Summary of handoff given to PCP: Assessment and Plan:   · Assessment		  75M PMHx HTN, HLD presents with near-syncope in the setting of intermittent dizziness, found to have EKG changes at PMD's office.     Negative workup. Discharge today, pt will follow up with PCP and cardiologist       Problem/Plan - 1:  ·  Problem: Pre-syncope.  Plan: - Single near syncope episode     - Normal sinus 54-70, No PVCs overnight.   - No structural abnormalities on TTE  - Unremarkable MRI  - Orthostatic vital signs: Lying 131/73; sitting 126/73; standing 129/84  - Most likely isolated event      Problem/Plan - 2:  ·  Problem: Amnesia.  Plan: - Inability to recall presyncopal event  - unremarkable MRI      Problem/Plan - 3:  ·  Problem: Hyperbilirubinemia.  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  Plan: - On admission, -145  - Continue home ARB (per therapeutic exchange, losartan 100 equivalent to home dose of valsartan 160);    -Continue home norvasc     Problem/Plan - 5:  ·  Problem: HLD (hyperlipidemia).  Plan: - Continue home statin  .

## 2020-06-26 NOTE — PROGRESS NOTE ADULT - ASSESSMENT
Assessment and Plan:   · Assessment		  75M PMHx HTN, HLD presents with near-syncope in the setting of intermittent dizziness, found to have EKG changes at PMD's office.      Problem/Plan - 1:  ·  Problem: Pre-syncope.  Plan: - Single near syncope episode in the context of intermittent dizziness over several months  - Ddx: Vasovagal, orthostatic hypotension, arrhythmia, cardiac structural abnormality, intracranial structural abnormality  - Normal sinus 54-70, occasional PVC overnight on tele.   - F/u TTE to assess for cardiac structural abnormality  - negative finding on CTA head and neck, f/u MRI brain as neurology consulted   - Orthostatic vital signs: Lying 131/73; sitting 126/73; standing 129/84  - Fall precautions  - Monitor for reoccurrence.      Problem/Plan - 2:  ·  Problem: Amnesia.  Plan: - Inability to recall presyncopal event  - DDx: intracranial structural abnormality, intoxication, infection, seizure  - Neurological imaging ordered per Neuro recs  - toxicology studies show negative findings  - Monitor for reoccurrence.      Problem/Plan - 3:  ·  Problem: Hyperbilirubinemia.  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      Problem/Plan - 4:  ·  Problem: Hypertension.  Plan: - On admission, -145  - Continue home ARB (per therapeutic exchange, losartan 100 equivalent to home dose of valsartan 160); will hold amlodipine pending orthostatics.      Problem/Plan - 5:  ·  Problem: HLD (hyperlipidemia).  Plan: - Continue home statin  - Will follow-up lipid panel in AM.      Problem/Plan - 6:  Problem: Need for prophylactic measure. Plan: DVT ppx: Lovenox  Diet: DASH/TLC  Disposition: Pending clinical course  Code Status: Discussion to be had.     Problem/Plan - 7:  ·  Problem: Discharge planning issues.  Plan: Transition of Care Status:  1. Name of PCP:  2. PCP contacted on admission: [  ] Y     [  ] N  3. PCP contacted at discharge: [  ] Y     [  ] N  4. Post-discharge appointment date and location:  5. Summary of handoff given to PCP: 75M PMHx HTN, HLD presents with near-syncope in the setting of intermittent dizziness, found to have EKG changes at PMD's office.

## 2020-06-26 NOTE — PROGRESS NOTE ADULT - PROBLEM SELECTOR PLAN 2
- Inability to recall presyncopal event  - DDx: intracranial structural abnormality, intoxication, infection, seizure  - Neurological imaging ordered per Neuro recs: negative findings on CT and MRI head  - toxicology studies show negative findings  - Monitor for reoccurrence

## 2020-06-26 NOTE — PROGRESS NOTE ADULT - SUBJECTIVE AND OBJECTIVE BOX
PROGRESS NOTE:   Patient is a 75y old  Male who presents with a chief complaint of Near-syncope and EKG changes (25 Jun 2020 06:26)      SUBJECTIVE / OVERNIGHT EVENTS:  No acute interval events. Pt was examined bedside. Normal sinus 50 - 60, no PVC overnight on tele. Denies cp/SOB/n/v/d/abdominal pain/fevers/chills.    ADDITIONAL REVIEW OF SYSTEMS: Denies fevers, chills, palpitations     MEDICATIONS  (STANDING):  aspirin enteric coated 81 milliGRAM(s) Oral daily  atorvastatin 10 milliGRAM(s) Oral at bedtime  cholecalciferol 1000 Unit(s) Oral daily  enoxaparin Injectable 40 milliGRAM(s) SubCutaneous daily  losartan 100 milliGRAM(s) Oral daily    MEDICATIONS  (PRN):      CAPILLARY BLOOD GLUCOSE    I&O's Summary    PHYSICAL EXAM:  Vital Signs Last 24 Hrs  T(C): 36.7 (26 Jun 2020 04:31), Max: 36.8 (25 Jun 2020 20:30)  T(F): 98.1 (26 Jun 2020 04:31), Max: 98.3 (26 Jun 2020 02:20)  HR: 55 (26 Jun 2020 04:31) (50 - 67)  BP: 139/82 (26 Jun 2020 04:31) (113/64 - 149/81)  BP(mean): 3 (26 Jun 2020 04:31) (3 - 3)  RR: 18 (26 Jun 2020 04:31) (18 - 18)  SpO2: 97% (26 Jun 2020 04:31) (95% - 97%)    CONSTITUTIONAL: NAD, well-developed  RESPIRATORY: Normal respiratory effort; lungs are clear to auscultation bilaterally  CARDIOVASCULAR: Regular rate and rhythm, normal S1 and S2, no murmur/rub/gallop; No lower extremity edema; Peripheral pulses are 2+ bilaterally  ABDOMEN: Nontender to palpation, normoactive bowel sounds, no rebound/guarding; No hepatosplenomegaly  MUSCLOSKELETAL: no clubbing or cyanosis of digits; no joint swelling or tenderness to palpation  PSYCH: A+O to person, place, and time; affect appropriate    LABS:                        14.1   6.25  )-----------( 180      ( 26 Jun 2020 05:22 )             42.6     06-26    139  |  106  |  26<H>  ----------------------------<  106<H>  4.2   |  24  |  1.20    Ca    9.1      26 Jun 2020 05:22  Phos  3.5     06-26  Mg     2.3     06-26    TPro  6.2  /  Alb  3.9  /  TBili  1.6<H>  /  DBili  x   /  AST  17  /  ALT  22  /  AlkPhos  42  06-26    MRI of head  No hydrocephalus, acute intracranial hemorrhage, mass effect, vasogenic edema, or acute infarction.    No abnormal parenchymal or leptomeningeal enhancement.        RADIOLOGY & ADDITIONAL TESTS:  Results Reviewed: CTA negative   Imaging Personally Reviewed:  Electrocardiogram Personally Reviewed:      COORDINATION OF CARE:  Care Discussed with Consultants/Other Providers [Y/N]:  Prior or Outpatient Records Reviewed [Y/N]:

## 2020-06-27 LAB
AMPHET UR-MCNC: NEGATIVE — SIGNIFICANT CHANGE UP
BARBITURATES, URINE.: NEGATIVE — SIGNIFICANT CHANGE UP
BENZODIAZ UR-MCNC: NEGATIVE — SIGNIFICANT CHANGE UP
COCAINE METAB.OTHER UR-MCNC: NEGATIVE — SIGNIFICANT CHANGE UP
CREATININE, URINE THERAPEUTIC: 80.4 MG/DL — SIGNIFICANT CHANGE UP
METHADONE UR-MCNC: NEGATIVE — SIGNIFICANT CHANGE UP
METHAQUALONE UR QL: NEGATIVE — SIGNIFICANT CHANGE UP
METHAQUALONE UR-MCNC: NEGATIVE — SIGNIFICANT CHANGE UP
OPIATES UR-MCNC: NEGATIVE — SIGNIFICANT CHANGE UP
PCP UR-MCNC: NEGATIVE — SIGNIFICANT CHANGE UP
PROPOXYPH UR QL: NEGATIVE — SIGNIFICANT CHANGE UP
THC UR QL: NEGATIVE — SIGNIFICANT CHANGE UP

## 2020-06-29 RX ORDER — AMLODIPINE BESYLATE 2.5 MG/1
2.5 TABLET ORAL
Qty: 90 | Refills: 3 | Status: ACTIVE | COMMUNITY
Start: 2020-06-29

## 2020-06-29 RX ORDER — ETODOLAC 500 MG/1
500 TABLET, FILM COATED ORAL TWICE DAILY
Refills: 0 | Status: DISCONTINUED | COMMUNITY
End: 2020-06-29

## 2020-06-29 RX ORDER — CHOLECALCIFEROL (VITAMIN D3) 25 MCG
25 MCG TABLET ORAL DAILY
Refills: 0 | Status: DISCONTINUED | COMMUNITY
Start: 2020-06-29 | End: 2020-06-29

## 2020-06-29 RX ORDER — ATORVASTATIN CALCIUM 10 MG/1
10 TABLET, FILM COATED ORAL
Qty: 90 | Refills: 3 | Status: ACTIVE | COMMUNITY
Start: 2020-06-29

## 2020-09-16 ENCOUNTER — NON-APPOINTMENT (OUTPATIENT)
Age: 75
End: 2020-09-16

## 2020-09-16 ENCOUNTER — APPOINTMENT (OUTPATIENT)
Dept: INTERNAL MEDICINE | Facility: CLINIC | Age: 75
End: 2020-09-16
Payer: MEDICARE

## 2020-09-16 VITALS
BODY MASS INDEX: 23.94 KG/M2 | WEIGHT: 171 LBS | HEART RATE: 64 BPM | TEMPERATURE: 98.7 F | OXYGEN SATURATION: 98 % | HEIGHT: 71 IN

## 2020-09-16 PROCEDURE — 36415 COLL VENOUS BLD VENIPUNCTURE: CPT

## 2020-09-16 PROCEDURE — G0439: CPT

## 2020-09-16 PROCEDURE — 93000 ELECTROCARDIOGRAM COMPLETE: CPT

## 2020-09-16 PROCEDURE — 82270 OCCULT BLOOD FECES: CPT

## 2020-09-16 NOTE — HISTORY OF PRESENT ILLNESS
[de-identified] : && M tx'ed for hypert,hyperlipidemia,? TIA. Social alc qd and liver palp w/o LFT abn. Feels well w/o CP symp. NO gi/gu/neuro sympt -walks 2-3 mi/d. Up to date on colonosc,vaccines. EKG normal w/o VPC's

## 2020-09-16 NOTE — HEALTH RISK ASSESSMENT
[0] : 2) Feeling down, depressed, or hopeless: Not at all (0) [With Patient/Caregiver] : With Patient/Caregiver [Relationship: ___] : Relationship: [unfilled] [I will adhere to the patient's wishes as expressed in the advance directive except as noted below.] : I will adhere to the patient's wishes as expressed in the advance directive except as noted below [AdvancecareDate] : 9/16/20 [FreeTextEntry4] : undecided on aggressive tx

## 2020-09-16 NOTE — PHYSICAL EXAM
[Normal Male:] : meatus normal, the bladder was normal on palpation, the scrotum was normal, there were no testicular masses and no prostate nodules. [Normal] : normal gait, coordination grossly intact, no focal deficits [de-identified] : liver edge palp on deep inspiration [FreeTextEntry1] : guaiac neg/prostste 2+ w/o nodules [de-identified] : neg [de-identified] : thyroid not palp [de-identified] : normal [de-identified] : normal affect

## 2020-09-17 LAB
ALBUMIN SERPL ELPH-MCNC: 4.6 G/DL
ALP BLD-CCNC: 52 U/L
ALT SERPL-CCNC: 22 U/L
ANION GAP SERPL CALC-SCNC: 10 MMOL/L
APPEARANCE: CLEAR
AST SERPL-CCNC: 23 U/L
BACTERIA: NEGATIVE
BASOPHILS # BLD AUTO: 0.03 K/UL
BASOPHILS NFR BLD AUTO: 0.5 %
BILIRUB SERPL-MCNC: 2.2 MG/DL
BILIRUBIN URINE: NEGATIVE
BLOOD URINE: NEGATIVE
BUN SERPL-MCNC: 27 MG/DL
CALCIUM SERPL-MCNC: 9.1 MG/DL
CHLORIDE SERPL-SCNC: 104 MMOL/L
CHOLEST SERPL-MCNC: 175 MG/DL
CHOLEST/HDLC SERPL: 2.1 RATIO
CK SERPL-CCNC: 148 U/L
CO2 SERPL-SCNC: 25 MMOL/L
COLOR: YELLOW
CREAT SERPL-MCNC: 0.97 MG/DL
CRP SERPL HS-MCNC: 1.16 MG/L
EOSINOPHIL # BLD AUTO: 0.07 K/UL
EOSINOPHIL NFR BLD AUTO: 1.3 %
GLUCOSE QUALITATIVE U: NEGATIVE
GLUCOSE SERPL-MCNC: 108 MG/DL
HCT VFR BLD CALC: 45 %
HDLC SERPL-MCNC: 83 MG/DL
HGB BLD-MCNC: 15 G/DL
HYALINE CASTS: 0 /LPF
IMM GRANULOCYTES NFR BLD AUTO: 0.2 %
KETONES URINE: NEGATIVE
LDLC SERPL CALC-MCNC: 79 MG/DL
LEUKOCYTE ESTERASE URINE: NEGATIVE
LYMPHOCYTES # BLD AUTO: 1.33 K/UL
LYMPHOCYTES NFR BLD AUTO: 24.4 %
MAN DIFF?: NORMAL
MCHC RBC-ENTMCNC: 29.6 PG
MCHC RBC-ENTMCNC: 33.3 GM/DL
MCV RBC AUTO: 88.8 FL
MICROSCOPIC-UA: NORMAL
MONOCYTES # BLD AUTO: 0.5 K/UL
MONOCYTES NFR BLD AUTO: 9.2 %
NEUTROPHILS # BLD AUTO: 3.52 K/UL
NEUTROPHILS NFR BLD AUTO: 64.4 %
NITRITE URINE: NEGATIVE
PH URINE: 6.5
PLATELET # BLD AUTO: 193 K/UL
POTASSIUM SERPL-SCNC: 4.5 MMOL/L
PROT SERPL-MCNC: 6.8 G/DL
PROTEIN URINE: NORMAL
RBC # BLD: 5.07 M/UL
RBC # FLD: 13.7 %
RED BLOOD CELLS URINE: 1 /HPF
SODIUM SERPL-SCNC: 139 MMOL/L
SPECIFIC GRAVITY URINE: 1.03
SQUAMOUS EPITHELIAL CELLS: 0 /HPF
TRIGL SERPL-MCNC: 60 MG/DL
UROBILINOGEN URINE: NORMAL
WBC # FLD AUTO: 5.46 K/UL
WHITE BLOOD CELLS URINE: 0 /HPF

## 2021-06-24 ENCOUNTER — APPOINTMENT (OUTPATIENT)
Dept: ORTHOPEDIC SURGERY | Facility: CLINIC | Age: 76
End: 2021-06-24
Payer: MEDICARE

## 2021-06-24 PROCEDURE — 99213 OFFICE O/P EST LOW 20 MIN: CPT

## 2021-06-24 PROCEDURE — 99072 ADDL SUPL MATRL&STAF TM PHE: CPT

## 2021-06-24 PROCEDURE — 73630 X-RAY EXAM OF FOOT: CPT | Mod: LT

## 2021-06-24 NOTE — HISTORY OF PRESENT ILLNESS
[de-identified] : Pt is a 75 y/o male c/o left foot pain x 1 week.  He states that the pain started after walking up and down  multiple  hills in [place].  It is worse whenever he puts pressure on his foot.  It is worse when he walks barefoot.  He notes that he had a stress fracture in his right foot a few years ago.

## 2021-06-24 NOTE — PHYSICAL EXAM
[de-identified] : Patient is WDWN, alert, and in no acute distress. Breathing is unlabored. He is grossly oriented to person, place, \par and time.\par \par Left Foot:\par Inspection/Palpation: Mild edema. Tenderness present over top of foot. \par Stability: Joint stability is intact. Full range of motion in toes. \par Strength: Plantar flexion, dorsiflexion, inversion and eversion 5/5  [de-identified] : AP, lateral and oblique views of the left foot were obtained today and revealed no fracture.

## 2021-06-24 NOTE — ADDENDUM
[FreeTextEntry1] : I, Leatha Rain wrote this note acting as a scribe for Dr. Caden Salinas on Jun 24, 2021.\par \par

## 2021-06-24 NOTE — END OF VISIT
[FreeTextEntry3] : All medical record entries made by the Scribe were at my,  Dr. Caden Salinas MD., direction and personally dictated by me on 06/24/2021. I have personally reviewed the chart and agree that the record accurately reflects my personal performance of the history, physical exam, assessment and plan.\par \par

## 2021-06-25 ENCOUNTER — APPOINTMENT (OUTPATIENT)
Dept: MRI IMAGING | Facility: IMAGING CENTER | Age: 76
End: 2021-06-25

## 2021-06-26 ENCOUNTER — TRANSCRIPTION ENCOUNTER (OUTPATIENT)
Age: 76
End: 2021-06-26

## 2021-07-16 ENCOUNTER — NON-APPOINTMENT (OUTPATIENT)
Age: 76
End: 2021-07-16

## 2021-07-16 ENCOUNTER — APPOINTMENT (OUTPATIENT)
Dept: ORTHOPEDIC SURGERY | Facility: CLINIC | Age: 76
End: 2021-07-16
Payer: MEDICARE

## 2021-07-16 VITALS
HEART RATE: 71 BPM | BODY MASS INDEX: 23.94 KG/M2 | DIASTOLIC BLOOD PRESSURE: 75 MMHG | WEIGHT: 171 LBS | HEIGHT: 71 IN | SYSTOLIC BLOOD PRESSURE: 121 MMHG

## 2021-07-16 PROCEDURE — 99072 ADDL SUPL MATRL&STAF TM PHE: CPT

## 2021-07-16 PROCEDURE — 28470 CLTX METATARSAL FX WO MNP EA: CPT | Mod: T2

## 2021-07-16 PROCEDURE — 99214 OFFICE O/P EST MOD 30 MIN: CPT | Mod: 57

## 2021-07-16 RX ORDER — ERGOCALCIFEROL 1.25 MG/1
1.25 MG CAPSULE, LIQUID FILLED ORAL
Qty: 6 | Refills: 1 | Status: ACTIVE | COMMUNITY
Start: 2021-07-16 | End: 1900-01-01

## 2021-07-16 NOTE — HISTORY OF PRESENT ILLNESS
[FreeTextEntry1] : IRVING MARQUEZ is a 76 year old male who presents for initial evaluation of left foot pain. Patient is here for 2nd opinion. Pain is 3/10 that is localized and achy. He presents in a medical stiff shoe. He notes there is occasional swelling. He reports taking 5,000 Vitamin D. He normally walks 2.5 miles a day but has not been doing it due to the pain.

## 2021-07-16 NOTE — DISCUSSION/SUMMARY
[de-identified] : Today I had a lengthy discussion with the patient regarding their left foot pain. I have addressed all the patient's concerns surrounding the pathology of their condition. MRI films were reviewed with the patient. \par \par I recommend that the patient utilize 50,000 units of vitamin D once per week. A prescription was given in the office today. I recommend the patient to continue utilizing the medical stiff shoe. I recommend that the patient utilize ice, heat, NSAIDs prn. They can also elevate their left foot above the level of the heart. \par \par I would like to see the patient back in the office in 1 month to reassess their condition. The patient understood and verbally agreed to the treatment plan. All of their questions were answered and they were satisfied with the visit. The patient should call the office if they have any questions or experience worsening symptoms.

## 2021-07-16 NOTE — PHYSICAL EXAM
[de-identified] : General: Alert and oriented x3. In no acute distress. Pleasant in nature with a normal affect. No apparent respiratory distress.\par \par Left Foot\par Skin: Clean, dry, intact\par Inspection: No obvious malalignment, no masses, dorsal midfoot swelling, no effusion\par Pulses: 2+ DP/PT pulses\par ROM: FOOT Full  ROM of digits, ANKLE 10 degrees of dorsiflexion, 40 degrees of plantarflexion, 10 degrees of subtalar motion.\par Painful ROM: None\par Tenderness: + 3rd metatarsal. No tenderness over the medial malleolus, No tenderness over the lateral malleolus, no CFL/ATFL/PTFL pain, no deltoid ligament pain. No heel pain. No Achilles tenderness. No 5th metatarsal pain. No pain to the LisFranc joint. No ttp over the posterior tibial tendon.\par Stability: Negative anterior/posterior drawer.\par Strength: 5/5 ADD/ABD/TA/GS/EHL/FHL/EDL\par Neuro: Sensation in tact to light touch throughout\par Additional tests: Negative Mortons test, negative tarsal tunnel tinels, negative single heel rise..  [de-identified] : MRI left foot 6/25/2021 obtained at  results review today, as reported in the chart. \par \par Impression:\par Low-grade impaction or insufficiency fracture at the third metatarsal head. There is prominent surrounding bone marrow edema,. There is a mild strain on interosseus musculature within the second and third interspaced. There is low grade injury of the dorsal third MTP joint capsule. \par \par Small second web space neuroma\par \par Mild degenerative changes at the first MP joint and at the midfoot.

## 2021-08-20 ENCOUNTER — APPOINTMENT (OUTPATIENT)
Dept: ORTHOPEDIC SURGERY | Facility: CLINIC | Age: 76
End: 2021-08-20
Payer: MEDICARE

## 2021-08-20 DIAGNOSIS — M21.40 FLAT FOOT [PES PLANUS] (ACQUIRED), UNSPECIFIED FOOT: ICD-10-CM

## 2021-08-20 PROCEDURE — 73630 X-RAY EXAM OF FOOT: CPT | Mod: LT

## 2021-08-20 PROCEDURE — 99024 POSTOP FOLLOW-UP VISIT: CPT

## 2021-08-20 NOTE — HISTORY OF PRESENT ILLNESS
[FreeTextEntry1] : 8/20/2021: IRVING MARQUEZ is a 76 year old male presenting for a follow-up evaluation of left foot pain, third metatarsal stress fracture. The patient’s pain is noted to be a 1/10. He has no pain while walking but states that he feels discomfort over his foot. The patient notes that he just transferred into wearing sneakers. He presents to the clinic today with no assisted devices while walking.		\par \par 7/16/2021: IRVING MARQUEZ is a 76 year old male who presents for initial evaluation of left foot pain. Patient is here for 2nd opinion. Pain is 3/10 that is localized and achy. He presents in a medical stiff shoe. He notes there is occasional swelling. He reports taking 5,000 Vitamin D. He normally walks 2.5 miles a day but has not been doing it due to the pain.

## 2021-08-20 NOTE — DISCUSSION/SUMMARY
[de-identified] : Today I had a lengthy discussion with the patient regarding their left foot pain, third metatarsal stress fracture. I have addressed all the patient's concerns surrounding the pathology of their condition. XR films were reviewed with the patient. Patient can begin participating in all of his normal physical activities with caution. If his pain and inflammation persists, I recommend that the patient utilize ice, NSAIDs, and heat PRN. They can also elevate their left foot above the level of the heart.		\par \par I would like to see the patient back in the office PRN to reassess their condition. The patient understood and verbally agreed to the treatment plan. All of their questions were answered and they were satisfied with the visit. The patient should call the office if they have any questions or experience worsening symptoms.

## 2021-08-20 NOTE — ADDENDUM
[FreeTextEntry1] : I, Madeleine Herring, acted solely as a scribe for Dr. Ted Chen on this date 08/20/2021.\par \par All medical record entries made by the Scribe were at my, Dr. Ted Chen, direction and personally dictated by me on 08/20/2021 . I have reviewed the chart and agree that the record accurately reflects my personal performance of the history, physical exam, assessment and plan. I have also personally directed, reviewed, and agreed with the chart.	\par

## 2021-08-20 NOTE — PHYSICAL EXAM
[de-identified] : General: Alert and oriented x3. In no acute distress. Pleasant in nature with a normal affect. No apparent respiratory distress.\par \par Left Foot\par Skin: Clean, dry, intact\par Inspection: No obvious malalignment, no masses, dorsal midfoot swelling, no effusion\par Pulses: 2+ DP/PT pulses\par ROM: FOOT Full  ROM of digits, ANKLE 10 degrees of dorsiflexion, 40 degrees of plantarflexion, 10 degrees of subtalar motion.\par Painful ROM: None\par Tenderness: No pain over the 3rd metatarsal. No tenderness over the medial malleolus, No tenderness over the lateral malleolus, no CFL/ATFL/PTFL pain, no deltoid ligament pain. No heel pain. No Achilles tenderness. No 5th metatarsal pain. No pain to the LisFranc joint. No ttp over the posterior tibial tendon.\par Stability: Negative anterior/posterior drawer.\par Strength: 5/5 ADD/ABD/TA/GS/EHL/FHL/EDL\par Neuro: Sensation in tact to light touch throughout\par Additional tests: Negative Mortons test, negative tarsal tunnel tinels, negative single heel rise..  [de-identified] : 3V of the left foot were ordered, obtained, and reviewed by me today, 08/20/2021, revealed: Normal XRs. \par

## 2021-08-27 ENCOUNTER — APPOINTMENT (OUTPATIENT)
Dept: ORTHOPEDIC SURGERY | Facility: CLINIC | Age: 76
End: 2021-08-27
Payer: MEDICARE

## 2021-08-27 PROCEDURE — 99024 POSTOP FOLLOW-UP VISIT: CPT

## 2021-08-27 NOTE — PHYSICAL EXAM
[de-identified] : General: Alert and oriented x3. In no acute distress. Pleasant in nature with a normal affect. No apparent respiratory distress.\par \par Left Foot\par Skin: Clean, dry, intact\par Inspection: No obvious malalignment, no masses, + dorsal midfoot swelling, no effusion\par Pulses: 2+ DP/PT pulses\par ROM: FOOT Full  ROM of digits, ANKLE 10 degrees of dorsiflexion, 40 degrees of plantarflexion, 10 degrees of subtalar motion.\par Painful ROM: None\par Tenderness: No pain over the 3rd metatarsal. No tenderness over the medial malleolus, No tenderness over the lateral malleolus, no CFL/ATFL/PTFL pain, no deltoid ligament pain. No heel pain. No Achilles tenderness. No 5th metatarsal pain. No pain to the LisFranc joint. No ttp over the posterior tibial tendon.\par Stability: Negative anterior/posterior drawer.\par Strength: 5/5 ADD/ABD/TA/GS/EHL/FHL/EDL\par Neuro: Sensation in tact to light touch throughout\par Additional tests: Negative Mortons test, negative tarsal tunnel tinels, negative single heel rise.\par \par Right Foot Physical Exam\par Skin: Clean, dry, intact\par Inspection: No obvious malalignment, no masses, + swelling, no effusion\par Pulses: 2+ DP/PT pulses\par ROM: FOOT Full  ROM of digits, ANKLE 10 degrees of dorsiflexion, 40 degrees of plantarflexion, 10 degrees of subtalar motion.\par Painful ROM: None\par Tenderness: No tenderness over the medial malleolus, No tenderness over the lateral malleolus, no CFL/ATFL/PTFL pain, no deltoid ligament pain. No heel pain. No Achilles tenderness. No 5th metatarsal pain. No pain to the LisFranc joint. No ttp over the posterior tibial tendon.\par Stability: Negative anterior/posterior drawer.\par Strength: 5/5 ADD/ABD/TA/GS/EHL/FHL/EDL\par Neuro: Sensation in tact to light touch throughout\par Additional tests: Negative Mortons test, negative tarsal tunnel tinels, negative single heel rise.	 [de-identified] : No new imaging today.\par

## 2021-08-27 NOTE — DISCUSSION/SUMMARY
[de-identified] : Today I had a lengthy discussion with the patient regarding their left foot pain, third metatarsal stress fracture. I have addressed all the patient's concerns surrounding the pathology of their condition. No new imaging was obtained today at then patient's visit. Patient can begin participating in all of his normal physical activities, but he should remain cautious. If his pain and inflammation persists, I recommend that the patient continue to utilize ice, NSAIDs, and heat PRN. They can also elevate their left foot above the level of the heart.\par \par I would like the patient to transition from the stiff medical shoe to a supportive, cushioning sneaker. A discussion was had about shoe-wear modifications. I recommended New Balance, Hurtado, or Sau[employer]y to the patient. 				\par \par I would like to see the patient back in the office in 4 weeks  to reassess their condition. The patient understood and verbally agreed to the treatment plan. All of their questions were answered and they were satisfied with the visit. The patient should call the office if they have any questions or experience worsening symptoms. \par \par -We will obtain new XRs of the left foot in 1 month at the patient's next visit.

## 2021-08-27 NOTE — ADDENDUM
[FreeTextEntry1] : I, Madeleine Herring, acted solely as a scribe for Dr. Ted Chen on this date 08/27/2021.\par \par All medical record entries made by the Scribe were at my, Dr. Ted Chen, direction and personally dictated by me on 08/27/2021 . I have reviewed the chart and agree that the record accurately reflects my personal performance of the history, physical exam, assessment and plan. I have also personally directed, reviewed, and agreed with the chart.	\par

## 2021-08-27 NOTE — HISTORY OF PRESENT ILLNESS
[FreeTextEntry1] : 8/27/2021: IRVING MARQUEZ is a 76 year old male presenting for a follow-up evaluation of left foot, 3rd metatarsal stress fracture. The patient’s pain is noted to be a 6/10, worsened since his last visit. He states continued pain over his foot, describing a "pins and needles" feeling. The patient confirms a new pain over his right foot and states that he has swelling over the foot. He presents to the clinic in a stiff medical shoe.	\par \par 8/20/2021: IRVING MARQUEZ is a 76 year old male presenting for a follow-up evaluation of left foot pain, third metatarsal stress fracture. The patient’s pain is noted to be a 1/10. He has no pain while walking but states that he feels discomfort over his foot. The patient notes that he just transferred into wearing sneakers. He presents to the clinic today with no assisted devices while walking.		\par \par 7/16/2021: IRVING MARQUEZ is a 76 year old male who presents for initial evaluation of left foot pain. Patient is here for 2nd opinion. Pain is 3/10 that is localized and achy. He presents in a medical stiff shoe. He notes there is occasional swelling. He reports taking 5,000 Vitamin D. He normally walks 2.5 miles a day but has not been doing it due to the pain.

## 2021-09-20 ENCOUNTER — APPOINTMENT (OUTPATIENT)
Dept: INTERNAL MEDICINE | Facility: CLINIC | Age: 76
End: 2021-09-20
Payer: MEDICARE

## 2021-09-20 ENCOUNTER — NON-APPOINTMENT (OUTPATIENT)
Age: 76
End: 2021-09-20

## 2021-09-20 VITALS
WEIGHT: 172 LBS | HEART RATE: 72 BPM | HEIGHT: 71 IN | TEMPERATURE: 97.7 F | OXYGEN SATURATION: 98 % | BODY MASS INDEX: 24.08 KG/M2

## 2021-09-20 DIAGNOSIS — M47.812 SPONDYLOSIS W/OUT MYELOPATHY OR RADICULOPATHY, CERVICAL REGION: ICD-10-CM

## 2021-09-20 DIAGNOSIS — R20.2 PARESTHESIA OF SKIN: ICD-10-CM

## 2021-09-20 PROCEDURE — 93000 ELECTROCARDIOGRAM COMPLETE: CPT

## 2021-09-20 PROCEDURE — G0439: CPT

## 2021-09-20 PROCEDURE — 36415 COLL VENOUS BLD VENIPUNCTURE: CPT

## 2021-09-20 NOTE — ASSESSMENT
[FreeTextEntry1] : pt is treated for lipidemia and hypertension -well controlled 120/80. EKG normal exc ! vpc - new and low voltage -old. Parathesia of foot ? 2nd to arthritis or local nn iritation. Overall doing well All screening tests ordered and zolpidem for sleep

## 2021-09-20 NOTE — HISTORY OF PRESENT ILLNESS
[de-identified] : pt is 66 M tx'ed for hypert, lipidemia and had stress fx of R foot -recovering -under care ortho. Only complaint tingling of all 5 R toes. No other focal neuro sympt. 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 Active w/o CP sympt. No GI,  sympt. Refused pneumovax -ipdated covid

## 2021-09-20 NOTE — PHYSICAL EXAM
[Normal Male:] : meatus normal, the bladder was normal on palpation, the scrotum was normal, there were no testicular masses and no prostate nodules. [Normal] : normal gait, coordination grossly intact, no focal deficits [FreeTextEntry1] : guaiac neg -prostat 2-3+ enlarged w/o mass [de-identified] : neg [de-identified] : neg [de-identified] : heberden's nodes [de-identified] : neg

## 2021-09-21 LAB
25(OH)D3 SERPL-MCNC: 50.7 NG/ML
ALBUMIN SERPL ELPH-MCNC: 4.6 G/DL
ALP BLD-CCNC: 57 U/L
ALT SERPL-CCNC: 24 U/L
ANION GAP SERPL CALC-SCNC: 9 MMOL/L
APPEARANCE: CLEAR
AST SERPL-CCNC: 22 U/L
BACTERIA: NEGATIVE
BASOPHILS # BLD AUTO: 0.04 K/UL
BASOPHILS NFR BLD AUTO: 0.6 %
BILIRUB SERPL-MCNC: 2.4 MG/DL
BILIRUBIN URINE: NEGATIVE
BLOOD URINE: NEGATIVE
BUN SERPL-MCNC: 30 MG/DL
CALCIUM SERPL-MCNC: 9.7 MG/DL
CHLORIDE SERPL-SCNC: 104 MMOL/L
CHOLEST SERPL-MCNC: 184 MG/DL
CK SERPL-CCNC: 140 U/L
CO2 SERPL-SCNC: 27 MMOL/L
COLOR: YELLOW
CREAT SERPL-MCNC: 1.02 MG/DL
CRP SERPL HS-MCNC: 0.5 MG/L
EOSINOPHIL # BLD AUTO: 0.13 K/UL
EOSINOPHIL NFR BLD AUTO: 2.1 %
GLUCOSE QUALITATIVE U: NEGATIVE
GLUCOSE SERPL-MCNC: 104 MG/DL
HCT VFR BLD CALC: 46.6 %
HDLC SERPL-MCNC: 91 MG/DL
HGB BLD-MCNC: 15.5 G/DL
HYALINE CASTS: 0 /LPF
IMM GRANULOCYTES NFR BLD AUTO: 0.5 %
KETONES URINE: NEGATIVE
LDLC SERPL CALC-MCNC: 79 MG/DL
LEUKOCYTE ESTERASE URINE: NEGATIVE
LYMPHOCYTES # BLD AUTO: 2.19 K/UL
LYMPHOCYTES NFR BLD AUTO: 34.9 %
MAN DIFF?: NORMAL
MCHC RBC-ENTMCNC: 29.8 PG
MCHC RBC-ENTMCNC: 33.3 GM/DL
MCV RBC AUTO: 89.6 FL
MICROSCOPIC-UA: NORMAL
MONOCYTES # BLD AUTO: 0.62 K/UL
MONOCYTES NFR BLD AUTO: 9.9 %
NEUTROPHILS # BLD AUTO: 3.26 K/UL
NEUTROPHILS NFR BLD AUTO: 52 %
NITRITE URINE: NEGATIVE
NONHDLC SERPL-MCNC: 93 MG/DL
PH URINE: 6
PLATELET # BLD AUTO: 202 K/UL
POTASSIUM SERPL-SCNC: 4.6 MMOL/L
PROT SERPL-MCNC: 7.2 G/DL
PROTEIN URINE: NORMAL
RBC # BLD: 5.2 M/UL
RBC # FLD: 13.8 %
RED BLOOD CELLS URINE: 1 /HPF
SODIUM SERPL-SCNC: 141 MMOL/L
SPECIFIC GRAVITY URINE: 1.03
SQUAMOUS EPITHELIAL CELLS: 0 /HPF
TRIGL SERPL-MCNC: 71 MG/DL
TSH SERPL-ACNC: 1.91 UIU/ML
UROBILINOGEN URINE: NORMAL
WBC # FLD AUTO: 6.27 K/UL
WHITE BLOOD CELLS URINE: 0 /HPF

## 2021-09-24 ENCOUNTER — APPOINTMENT (OUTPATIENT)
Dept: ORTHOPEDIC SURGERY | Facility: CLINIC | Age: 76
End: 2021-09-24

## 2021-09-27 ENCOUNTER — APPOINTMENT (OUTPATIENT)
Dept: ORTHOPEDIC SURGERY | Facility: CLINIC | Age: 76
End: 2021-09-27
Payer: MEDICARE

## 2021-09-27 DIAGNOSIS — M84.376A STRESS FRACTURE, UNSPECIFIED FOOT, INITIAL ENCOUNTER FOR FRACTURE: ICD-10-CM

## 2021-09-27 DIAGNOSIS — M79.672 PAIN IN LEFT FOOT: ICD-10-CM

## 2021-09-27 PROCEDURE — 99024 POSTOP FOLLOW-UP VISIT: CPT

## 2021-09-27 NOTE — DISCUSSION/SUMMARY
[de-identified] : Today I had a lengthy discussion with the patient regarding their left foot, 3rd MT stress fracture. I have addressed all the patient's concerns surrounding the pathology of their condition. Given that the patient has no pain upon clinical examination, I am advising that the fracture line is healing at this time. Continue with icing and heating of the left foot for inflammation and symptomatic relief. I also recommended that the patient utilize Voltaren gel topically. If the Voltaren gel could not be obtained, Icy Hot, Biofreeze, or Bengay can be utilized instead.		\par \par At this time I would like to obtain advanced imaging of the patient's left foot to assess healing of the stress fracture. An MRI was ordered so I can find out more about the etiology of the patient's condition. The patient should follow up with the office after obtaining the MRI. The patient understood and verbally agreed to the treatment plan. All of their questions were answered and they were satisfied with the visit. The patient should call the office if they have any questions or experience worsening symptoms.\par \par Possible Telehealth call to review MRI results. Patient will be returning to [place] in mid-October for the winter season. 		\par

## 2021-09-27 NOTE — ADDENDUM
[FreeTextEntry1] : I, Madeleine Herring, acted solely as a scribe for Dr. Ted Chen on this date 09/27/2021.\par \par All medical record entries made by the Scribe were at my, Dr. Ted Chen, direction and personally dictated by me on 09/27/2021 . I have reviewed the chart and agree that the record accurately reflects my personal performance of the history, physical exam, assessment and plan. I have also personally directed, reviewed, and agreed with the chart.	\par

## 2021-09-27 NOTE — PHYSICAL EXAM
[de-identified] : General: Alert and oriented x3. In no acute distress. Pleasant in nature with a normal affect. No apparent respiratory distress.\par \par Left Foot Physical Examination\par Skin: Clean, dry, intact\par Inspection: No obvious malalignment, no masses, + dorsal midfoot swelling, no effusion\par Pulses: 2+ DP/PT pulses\par ROM: FOOT Full  ROM of digits, ANKLE 10 degrees of dorsiflexion, 40 degrees of plantarflexion, 10 degrees of subtalar motion.\par Painful ROM: None\par Tenderness: No tenderness over the 3rd metatarsal. No tenderness over the medial malleolus, No tenderness over the lateral malleolus, no CFL/ATFL/PTFL pain, no deltoid ligament pain. No heel pain. No Achilles tenderness. No 5th metatarsal pain. No pain to the LisFranc joint. No ttp over the posterior tibial tendon.\par Stability: Negative anterior/posterior drawer.\par Strength: 5/5 ADD/ABD/TA/GS/EHL/FHL/EDL\par Neuro: Sensation in tact to light touch throughout\par Additional tests: Negative Mortons test, negative tarsal tunnel tinels, negative single heel rise.\par \par Right Foot Physical Exam\par Skin: Clean, dry, intact\par Inspection: No obvious malalignment, no masses, + swelling, no effusion\par Pulses: 2+ DP/PT pulses\par ROM: FOOT Full  ROM of digits, ANKLE 10 degrees of dorsiflexion, 40 degrees of plantarflexion, 10 degrees of subtalar motion.\par Painful ROM: None\par Tenderness: No tenderness over the medial malleolus, No tenderness over the lateral malleolus, no CFL/ATFL/PTFL pain, no deltoid ligament pain. No heel pain. No Achilles tenderness. No 5th metatarsal pain. No pain to the LisFranc joint. No ttp over the posterior tibial tendon.\par Stability: Negative anterior/posterior drawer.\par Strength: 5/5 ADD/ABD/TA/GS/EHL/FHL/EDL\par Neuro: Sensation in tact to light touch throughout\par Additional tests: Negative Mortons test, negative tarsal tunnel tinels, negative single heel rise.	 [de-identified] : No new imaging was obtained today.\par

## 2021-09-27 NOTE — HISTORY OF PRESENT ILLNESS
[FreeTextEntry1] : 9/27/2021: IRVING MARQUEZ is a 76 year old male presenting for a follow-up evaluation of left foot, 3rd MT stress fracture. The patient’s pain is noted to improved. However, he c/o of swelling and tingling sensations on his metatarsals. The patient states that he has been favoring the left foot as he has been more extensively weightbearing of his right lower extremity due to his injury. He presents to the clinic in sneakers without any assistance devices. No other complaints.	\par \par 8/27/2021: IRVING MARQUEZ is a 76 year old male presenting for a follow-up evaluation of left foot, 3rd metatarsal stress fracture. The patient’s pain is noted to be a 6/10, worsened since his last visit. He states continued pain over his foot, describing a "pins and needles" feeling. The patient confirms a new pain over his right foot and states that he has swelling over the foot. He presents to the clinic in a stiff medical shoe.	\par \par 8/20/2021: IRVING MARQUEZ is a 76 year old male presenting for a follow-up evaluation of left foot pain, third metatarsal stress fracture. The patient’s pain is noted to be a 1/10. He has no pain while walking but states that he feels discomfort over his foot. The patient notes that he just transferred into wearing sneakers. He presents to the clinic today with no assisted devices while walking.		\par \par 7/16/2021: IRVING MARQUEZ is a 76 year old male who presents for initial evaluation of left foot pain. Patient is here for 2nd opinion. Pain is 3/10 that is localized and achy. He presents in a medical stiff shoe. He notes there is occasional swelling. He reports taking 5,000 Vitamin D. He normally walks 2.5 miles a day but has not been doing it due to the pain.

## 2021-10-07 ENCOUNTER — APPOINTMENT (OUTPATIENT)
Dept: ORTHOPEDIC SURGERY | Facility: CLINIC | Age: 76
End: 2021-10-07

## 2022-01-12 NOTE — DISCUSSION/SUMMARY
SUBJECTIVE:    Patient ID: Mayur Lopez is a75 y.o. male. Mayur Lopez [address] in the Last Year: Never true   Transportation Needs:     Lack of Transportation (Medical): Not on file    Lack of Transportation (Non-Medical):  Not on file   Physical Activity:     Days of Exercise per Week: Not on file [de-identified] : The underlying pathophysiology was reviewed with the patient. XR films were reviewed with the patient. Discussed at length the nature of the patient’s condition. \par \par Encouraged patient to wear rigid soled shoes\par An MRI of the left foot was ordered to evaluate for stress fracture. 	\par Post-op shoe applied. \par \par Patient can continue activities as tolerated. All questions answered, understanding verbalized. Patient in agreement with plan of care. Patient will follow-up to review the results.	  transcription, some errors in transcription may have occurred.

## 2022-03-02 NOTE — PROGRESS NOTE ADULT - PROBLEM/PLAN-1
----- Message from Gildardo West MD sent at 3/1/2022  1:09 PM CST -----  Elevated liver subfraction, rec liver US, what is pend?   DISPLAY PLAN FREE TEXT

## 2022-06-16 ENCOUNTER — APPOINTMENT (OUTPATIENT)
Dept: ORTHOPEDIC SURGERY | Facility: CLINIC | Age: 77
End: 2022-06-16
Payer: MEDICARE

## 2022-06-16 VITALS — HEIGHT: 71 IN | BODY MASS INDEX: 24.36 KG/M2 | WEIGHT: 174 LBS

## 2022-06-16 DIAGNOSIS — M75.21 BICIPITAL TENDINITIS, RIGHT SHOULDER: ICD-10-CM

## 2022-06-16 DIAGNOSIS — M25.512 PAIN IN RIGHT SHOULDER: ICD-10-CM

## 2022-06-16 DIAGNOSIS — M25.511 PAIN IN RIGHT SHOULDER: ICD-10-CM

## 2022-06-16 DIAGNOSIS — M75.41 IMPINGEMENT SYNDROME OF RIGHT SHOULDER: ICD-10-CM

## 2022-06-16 DIAGNOSIS — M75.22 BICIPITAL TENDINITIS, LEFT SHOULDER: ICD-10-CM

## 2022-06-16 PROCEDURE — 99214 OFFICE O/P EST MOD 30 MIN: CPT

## 2022-06-16 PROCEDURE — 73030 X-RAY EXAM OF SHOULDER: CPT | Mod: 50

## 2022-07-14 ENCOUNTER — RX RENEWAL (OUTPATIENT)
Age: 77
End: 2022-07-14

## 2022-07-14 RX ORDER — ZOLPIDEM TARTRATE 5 MG/1
5 TABLET ORAL
Qty: 30 | Refills: 5 | Status: ACTIVE | COMMUNITY
Start: 2020-03-25 | End: 1900-01-01

## 2022-07-28 ENCOUNTER — NON-APPOINTMENT (OUTPATIENT)
Age: 77
End: 2022-07-28

## 2022-07-28 ENCOUNTER — APPOINTMENT (OUTPATIENT)
Dept: INTERNAL MEDICINE | Facility: CLINIC | Age: 77
End: 2022-07-28

## 2022-07-28 VITALS
WEIGHT: 173 LBS | SYSTOLIC BLOOD PRESSURE: 130 MMHG | DIASTOLIC BLOOD PRESSURE: 80 MMHG | HEIGHT: 71 IN | HEART RATE: 56 BPM | TEMPERATURE: 98 F | OXYGEN SATURATION: 98 % | BODY MASS INDEX: 24.22 KG/M2

## 2022-07-28 DIAGNOSIS — F17.200 NICOTINE DEPENDENCE, UNSPECIFIED, UNCOMPLICATED: ICD-10-CM

## 2022-07-28 DIAGNOSIS — I10 ESSENTIAL (PRIMARY) HYPERTENSION: ICD-10-CM

## 2022-07-28 DIAGNOSIS — E78.5 HYPERLIPIDEMIA, UNSPECIFIED: ICD-10-CM

## 2022-07-28 DIAGNOSIS — Z23 ENCOUNTER FOR IMMUNIZATION: ICD-10-CM

## 2022-07-28 DIAGNOSIS — G47.00 INSOMNIA, UNSPECIFIED: ICD-10-CM

## 2022-07-28 DIAGNOSIS — Z00.00 ENCOUNTER FOR GENERAL ADULT MEDICAL EXAMINATION W/OUT ABNORMAL FINDINGS: ICD-10-CM

## 2022-07-28 PROCEDURE — 82270 OCCULT BLOOD FECES: CPT

## 2022-07-28 PROCEDURE — G0439: CPT

## 2022-07-28 PROCEDURE — 93000 ELECTROCARDIOGRAM COMPLETE: CPT

## 2022-07-28 PROCEDURE — G0009: CPT

## 2022-07-28 PROCEDURE — 90677 PCV20 VACCINE IM: CPT

## 2022-07-28 PROCEDURE — 36415 COLL VENOUS BLD VENIPUNCTURE: CPT

## 2022-07-28 RX ORDER — ZOLPIDEM TARTRATE 5 MG/1
5 TABLET ORAL
Qty: 30 | Refills: 5 | Status: ACTIVE | COMMUNITY
Start: 2022-07-28 | End: 1900-01-01

## 2022-07-28 RX ORDER — AMLODIPINE BESYLATE 2.5 MG/1
2.5 TABLET ORAL DAILY
Qty: 30 | Refills: 2 | Status: ACTIVE | COMMUNITY
Start: 2022-07-28

## 2022-07-28 NOTE — ASSESSMENT
[FreeTextEntry1] : The patient is treated for hypertension which is under good control and hyperlipidemia which is checked with CPK lipids and liver functions.  EKG is done normal with sinus bradycardia.  The patient is given Prevnar 20 and all other vaccinations are up-to-date with the exception of shingles which she refuses.  He is given a refill on Ambien

## 2022-07-28 NOTE — HISTORY OF PRESENT ILLNESS
[de-identified] : With hyper77-year-old male with tension hyperlipidemia status post fracture right elbow in [place] repaired with screws and plate in for physical exam review of systems negative

## 2022-07-28 NOTE — ASSESSMENT
[FreeTextEntry1] : Patient's blood pressure is under good control at 130/80 but he has not taken his medicine this morning we are checking his lipids CPK and liver functions.  All screening tests are done including EKG and urine.  He is given a Prevnar 20 and all other vaccinations are up-to-date with the exception of shingles which she does not want.  Overall he appears to be doing well.  EKG shows a sinus bradycardia but is otherwise within normal limits

## 2022-07-28 NOTE — PHYSICAL EXAM
[Normal Male:] : meatus normal, the bladder was normal on palpation, the scrotum was normal, there were no testicular masses and no prostate nodules. [Normal] : normal gait, coordination grossly intact, no focal deficits [FreeTextEntry1] : Guaiac negative large prostate without nodules [de-identified] : No adenopathy [de-identified] : Thyroid nonpalpable [de-identified] : Heberden's nodes [de-identified] : Tanned but no skin lesions

## 2022-07-28 NOTE — REVIEW OF SYSTEMS
[Negative] : Heme/Lymph [FreeTextEntry9] : Chronic pain in the shoulders and status post fracture of the right elbow

## 2022-07-28 NOTE — HISTORY OF PRESENT ILLNESS
[de-identified] : The patient is a 77-year-old male who comes in for complete physical examination.  He has been treated for hypertension as well as hyperlipidemia but is otherwise been in good health with the exception of a fall with fracture of his right forearm this winter.  He remains physically active and walks 2-1/2 miles a day without chest pain palpitation swelling fainting or dyspnea.  He has no GI symptoms and is up-to-date on colonoscopy.  He denies change in bowel blood in the stool or melena.  He has no symptoms of BPH blood in his urine or burning of urination.  He has no focalizing neurologic signs or symptoms but does have aches and pains in his shoulders as well as the fracture of his right elbow.  He is up-to-date on all vaccinations with the exception of pneumonia which I will give him.  He remains feeling well

## 2022-07-28 NOTE — PHYSICAL EXAM
[Normal Male:] : meatus normal, the bladder was normal on palpation, the scrotum was normal, there were no testicular masses and no prostate nodules. [Normal] : normal gait, coordination grossly intact, no focal deficits [FreeTextEntry1] : Prostate is enlarged but there are no masses guaiac is negative [de-identified] : No adenopathy [de-identified] : Thyroid is nonpalpable [de-identified] : Heberden's nodes [de-identified] : Tanned but no significant lesions

## 2022-07-29 LAB
ALBUMIN SERPL ELPH-MCNC: 4.5 G/DL
ALP BLD-CCNC: 70 U/L
ALT SERPL-CCNC: 26 U/L
ANION GAP SERPL CALC-SCNC: 11 MMOL/L
APPEARANCE: CLEAR
AST SERPL-CCNC: 24 U/L
BACTERIA: NEGATIVE
BASOPHILS # BLD AUTO: 0.04 K/UL
BASOPHILS NFR BLD AUTO: 0.8 %
BILIRUB SERPL-MCNC: 1 MG/DL
BILIRUBIN URINE: NEGATIVE
BLOOD URINE: NEGATIVE
BUN SERPL-MCNC: 20 MG/DL
CALCIUM SERPL-MCNC: 9.4 MG/DL
CHLORIDE SERPL-SCNC: 107 MMOL/L
CHOLEST SERPL-MCNC: 151 MG/DL
CK SERPL-CCNC: 122 U/L
CO2 SERPL-SCNC: 24 MMOL/L
COLOR: YELLOW
CREAT SERPL-MCNC: 1 MG/DL
CRP SERPL HS-MCNC: 0.3 MG/L
EGFR: 77 ML/MIN/1.73M2
EOSINOPHIL # BLD AUTO: 0.06 K/UL
EOSINOPHIL NFR BLD AUTO: 1.1 %
GLUCOSE QUALITATIVE U: NEGATIVE
GLUCOSE SERPL-MCNC: 109 MG/DL
HCT VFR BLD CALC: 47.4 %
HDLC SERPL-MCNC: 71 MG/DL
HGB BLD-MCNC: 15 G/DL
HYALINE CASTS: 0 /LPF
IMM GRANULOCYTES NFR BLD AUTO: 0.2 %
KETONES URINE: NEGATIVE
LDLC SERPL CALC-MCNC: 71 MG/DL
LEUKOCYTE ESTERASE URINE: NEGATIVE
LYMPHOCYTES # BLD AUTO: 1.64 K/UL
LYMPHOCYTES NFR BLD AUTO: 30.8 %
MAN DIFF?: NORMAL
MCHC RBC-ENTMCNC: 29.4 PG
MCHC RBC-ENTMCNC: 31.6 GM/DL
MCV RBC AUTO: 92.9 FL
MICROSCOPIC-UA: NORMAL
MONOCYTES # BLD AUTO: 0.49 K/UL
MONOCYTES NFR BLD AUTO: 9.2 %
NEUTROPHILS # BLD AUTO: 3.08 K/UL
NEUTROPHILS NFR BLD AUTO: 57.9 %
NITRITE URINE: NEGATIVE
NONHDLC SERPL-MCNC: 80 MG/DL
PH URINE: 6
PLATELET # BLD AUTO: 192 K/UL
POTASSIUM SERPL-SCNC: 4.8 MMOL/L
PROT SERPL-MCNC: 6.9 G/DL
PROTEIN URINE: ABNORMAL
RBC # BLD: 5.1 M/UL
RBC # FLD: 13.9 %
RED BLOOD CELLS URINE: 1 /HPF
SODIUM SERPL-SCNC: 142 MMOL/L
SPECIFIC GRAVITY URINE: 1.03
SQUAMOUS EPITHELIAL CELLS: 0 /HPF
T4 SERPL-MCNC: 7.3 UG/DL
TRIGL SERPL-MCNC: 44 MG/DL
TSH SERPL-ACNC: 1.78 UIU/ML
UROBILINOGEN URINE: NORMAL
WBC # FLD AUTO: 5.32 K/UL
WHITE BLOOD CELLS URINE: 1 /HPF

## 2022-08-02 DIAGNOSIS — I10 ESSENTIAL (PRIMARY) HYPERTENSION: ICD-10-CM

## 2022-08-09 ENCOUNTER — LABORATORY RESULT (OUTPATIENT)
Age: 77
End: 2022-08-09

## 2022-08-19 RX ORDER — ASPIRIN/CALCIUM CARB/MAGNESIUM 324 MG
1 TABLET ORAL
Qty: 0 | Refills: 0 | DISCHARGE

## 2022-08-19 RX ORDER — VALSARTAN 80 MG/1
0 TABLET ORAL
Qty: 0 | Refills: 0 | DISCHARGE

## 2022-08-19 RX ORDER — CHOLECALCIFEROL (VITAMIN D3) 125 MCG
0 CAPSULE ORAL
Qty: 0 | Refills: 0 | DISCHARGE

## 2022-08-19 RX ORDER — AMLODIPINE BESYLATE 2.5 MG/1
1 TABLET ORAL
Qty: 0 | Refills: 0 | DISCHARGE

## 2022-08-19 RX ORDER — ATORVASTATIN CALCIUM 80 MG/1
1 TABLET, FILM COATED ORAL
Qty: 0 | Refills: 0 | DISCHARGE

## 2022-09-20 PROBLEM — E78.5 HYPERLIPIDEMIA, UNSPECIFIED: Chronic | Status: ACTIVE | Noted: 2020-06-23

## 2022-09-22 ENCOUNTER — APPOINTMENT (OUTPATIENT)
Dept: ORTHOPEDIC SURGERY | Facility: CLINIC | Age: 77
End: 2022-09-22

## 2022-09-22 VITALS — WEIGHT: 172 LBS | HEIGHT: 61 IN | BODY MASS INDEX: 32.47 KG/M2

## 2022-09-22 DIAGNOSIS — M17.12 UNILATERAL PRIMARY OSTEOARTHRITIS, LEFT KNEE: ICD-10-CM

## 2022-09-22 DIAGNOSIS — S80.02XA CONTUSION OF LEFT KNEE, INITIAL ENCOUNTER: ICD-10-CM

## 2022-09-22 PROCEDURE — 73564 X-RAY EXAM KNEE 4 OR MORE: CPT | Mod: LT

## 2022-09-22 PROCEDURE — 99214 OFFICE O/P EST MOD 30 MIN: CPT

## 2022-09-22 NOTE — HISTORY OF PRESENT ILLNESS
[de-identified] : 77 year old male presents complaining of left knee pain following an injury about 4 weeks ago. He tripped up the stairs and banged the lateral side of the left knee. He noticed mild bruising but no significant swelling or pain. He then went to [place] and did a lot of walking and stair climbing without problems. The pain started upon returning home. He regularly walks 2.5 miles a day, but has not been able to do so from the pain. He notices slight swelling and had a feeling of buckling once. He denies that the knee ever gave out. He does not have pain when sleeping at night. He has not had treatment for his knee in the past. \par PMHx- HTN (Valsartan, Amlodipine), HLD (Atorvastatin). Takes ASA 81mg. Of Note: He is leaving [place] on 10/8.

## 2022-09-22 NOTE — DISCUSSION/SUMMARY
[de-identified] : I discussed the underlying pathophysiology of the patient's condition in great detail with them. I went over the patient's x-rays with them in great detail. The extent of the patient’s arthritis was discussed in great detail with them. At this time, he will start a course of physical therapy to strengthen his left knee. A prescription was provided. He should cut down on the walking for now. We discussed the use of anti-inflammatories to relieve pain. I do not recommend he takes ibuprofen 800mg at his age because of the risk of ulcers. I recommend he takes Celebrex instead. A prescription was provided. I am not recommending a cortisone injection today because his knee has been healing on its own. If it is not improved in the next 2 weeks he can follow-up on Friday 10/7 for a cortisone injection. We can also look at his other problem. All of their questions were answered. They understand and consent to the plan.\par \par FU in 2 weeks PRN if he needs a cortisone injection.\par after undergoing PT and taking Celebrex.

## 2022-09-22 NOTE — CONSULT LETTER
[Dear  ___] : Dear  [unfilled], [Consult Letter:] : I had the pleasure of evaluating your patient, [unfilled]. [Please see my note below.] : Please see my note below. [Consult Closing:] : Thank you very much for allowing me to participate in the care of this patient.  If you have any questions, please do not hesitate to contact me. [Sincerely,] : Sincerely, [FreeTextEntry2] : JARRELL ORTIZ  [FreeTextEntry3] : Sanjiv Fernandes M.D.

## 2022-09-22 NOTE — PHYSICAL EXAM
[de-identified] : Constitutional\par o Appearance : well-nourished, well developed, alert, in no acute distress \par Head and Face\par o Head :\par ¦ Inspection : atraumatic, normocephalic\par o Face :\par ¦ Inspection : no visible rash or discoloration\par Respiratory\par o Respiratory Effort: breathing unlabored \par Neurologic\par o Mental Status Examination :\par ¦ Orientation : grossly oriented to person, place and time\par Psychiatric\par o Mood and Affect: mood normal, affect appropriate \par \par Right Lower Extremity\par o Buttock : no tenderness, swelling or deformities \par o Right Hip :\par ¦ Inspection/Palpation : no tenderness, swelling or deformities\par ¦ Range of Motion : full and painless in all planes, no crepitance\par ¦ Stability : joint stability intact\par ¦ Strength : extension, flexion, adduction, abduction, internal rotation and external rotation 5/5 \par \par o Right Knee :\par ¦ Inspection/Palpation : no tenderness to palpation, no swelling\par ¦ Range of Motion : active flexion and extension full and painless, no crepitance\par ¦ Stability : no valgus or varus instability present on provocative testing\par ¦ Strength : flexion and extension 5/5\par ¦ Tests and Signs : Anterior Drawer negative, Lachman negative, Allie's negative\par \par Left Lower Extremity\par o Buttock : no tenderness, swelling or deformities \par o Left Hip :\par ¦ Inspection/Palpation : no tenderness, no swelling or deformities\par ¦ Range of Motion : full and painless in all planes, no crepitance\par ¦ Stability : joint stability intact\par ¦ Strength : extension, flexion, adduction, abduction, internal rotation and external rotation, 3+/5\par \par o Left Knee :\par ¦ Inspection/Palpation : no medial or lateral compartment tenderness, lateral femoral condylar tenderness to palpation, no swelling\par ¦ Range of Motion : full extension, pain with flexion past 115°, no crepitance, slightly decreased patellofemoral glide \par ¦ Stability : no valgus or varus instability present on provocative testing\par ¦ Strength : flexion and extension 3+/5\par ¦ Tests and Signs : Anterior Drawer negative, Lachman negative, Allie's negative\par \par Gait: slight abductor lurch on the left, no significant extremity swelling or lymphedema\par \par Radiology Results:\par o Left Knee: Standing AP, lateral, tunnel and skyline views were obtained and revealed significant medial and patellofemoral arthritis. No lytic lesions.

## 2022-09-22 NOTE — ADDENDUM
[FreeTextEntry1] : I, Jhonathan Steiner, acted solely as a scribe for Dr. Sanjiv Fernandes on this date 09/22/2022.\par All medical record entries made by the Scribe were at my, Dr. Sanjiv Fernandes, direction and personally dictated by me on 09/22/2022. I have reviewed the chart and agree that the record accurately reflects my personal performance of the history, physical exam, assessment and plan. I have also personally directed, reviewed, and agreed with the chart.

## 2022-09-23 RX ORDER — CELECOXIB 200 MG/1
200 CAPSULE ORAL
Qty: 30 | Refills: 3 | Status: ACTIVE | COMMUNITY
Start: 2022-09-23 | End: 1900-01-01

## 2022-09-29 ENCOUNTER — APPOINTMENT (OUTPATIENT)
Dept: INTERNAL MEDICINE | Facility: CLINIC | Age: 77
End: 2022-09-29

## 2022-10-03 ENCOUNTER — APPOINTMENT (OUTPATIENT)
Dept: ORTHOPEDIC SURGERY | Facility: CLINIC | Age: 77
End: 2022-10-03

## 2022-10-07 ENCOUNTER — APPOINTMENT (OUTPATIENT)
Dept: ORTHOPEDIC SURGERY | Facility: CLINIC | Age: 77
End: 2022-10-07

## 2023-03-20 NOTE — ASU PREOP CHECKLIST - ANTIBIOTIC
From: Gael Doty  To: Elena Wren  Sent: 3/19/2023 6:02 AM CDT  Subject: History of Nasal polyps    Hi Dr Wren, do you think it is worth to see an ENT to evaluate my history of nasal polyps? I recently noticed a mildly increased difficulty in breathing through left nostril, nothing serious. 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 Thanks. - Gael Doty   n/a yes...

## 2023-06-26 ENCOUNTER — APPOINTMENT (OUTPATIENT)
Dept: ORTHOPEDIC SURGERY | Facility: CLINIC | Age: 78
End: 2023-06-26

## 2023-08-21 ENCOUNTER — APPOINTMENT (OUTPATIENT)
Dept: ORTHOPEDIC SURGERY | Facility: CLINIC | Age: 78
End: 2023-08-21
Payer: MEDICARE

## 2023-08-21 DIAGNOSIS — S93.492S SPRAIN OF OTHER LIGAMENT OF LEFT ANKLE, SEQUELA: ICD-10-CM

## 2023-08-21 DIAGNOSIS — S93.492A SPRAIN OF OTHER LIGAMENT OF LEFT ANKLE, INITIAL ENCOUNTER: ICD-10-CM

## 2023-08-21 PROCEDURE — 73610 X-RAY EXAM OF ANKLE: CPT | Mod: LT

## 2023-08-21 PROCEDURE — 99214 OFFICE O/P EST MOD 30 MIN: CPT

## 2023-08-21 NOTE — PHYSICAL EXAM
[de-identified] : Constitutional o Appearance : well-nourished, well developed, alert, in no acute distress  Head and Face o Head :  Inspection : atraumatic, normocephalic o Face :  Inspection : no visible rash or discoloration Lymphatic o Epitrochlear Nodes : no lymphadenopathy  o Popliteal Nodes : no palpable nodes present  o Supraclavicular Nodes : no supraclavicular nodes present  o Preauricular Nodes : no preauricular nodes present  o Additional Nodes : no additional adenopathy present Skin and Subcutaneous Tissue  o Head and Neck :  Face : no facial lesions Neurologic o Mental Status Examination :  Orientation : alert and oriented X 3 o Coordination : finger-to-nose-to-finger testing normal bilaterally, heel-shin cerebellar test within normal limits bilaterally  Psychiatric o Mood and Affect: mood normal, affect appropriate   Right Lower Extremity o Ankle :  Inspection/Palpation : no tenderness to palpation, no swelling  Range of Motion : arc of motion full and painless in all planes  Stability : no joint instability on provocative testing  Strength : all muscles 5/5 o Skin : no erythema or ecchymosis present o Sensation : sensation to pin intact o Tests and Signs : negative Anterior Drawer, negative Mckeon test  o Foot:  Inspection/Palpation : no tenderness to palpation, no swelling  Range of Motion : arc of motion full and painless in all planes  Stability : no joint instability on provocative testing  Strength : all muscles 5/5 o Skin : no erythema or ecchymosis present  Left Lower Extremity  o left Ankle :  Inspection/Palpation : tenderness over the distal tib/fib to palpation, fibular tenderness, no deltoid ligament tenderness swelling      Range of Motion : limited inversion, eversion,   Stability : no joint instability en provocative testing  Strength : all muscles 4/5  Tests and Signs : Mckeon's test negative o Skin : no erythema or ecchymosis present o Sensation : sensation to pin intact o Tests and Signs : negative Anterior Drawer, negative Mckeon test  o Foot:  Inspection/Palpation : no tenderness to palpation, no swelling  Range of Motion : arc of motion full and painless in all planes  Stability : no joint instability on provocative testing  Strength : all muscles 5/5 o Skin : no erythema or ecchymosis present  Gait and Station: Gait: poor push off, swelling of the left ankle,  plano valgus deformity,no significant extremity swelling or lymphedema, good proprioception and balance   Radiology Results  o Left Ankle : AP, lateral and mortise views were obtained and revealed mild subtalar arthritis with mild to moderate midfoot arthritis and no evident fracture.

## 2023-08-21 NOTE — DISCUSSION/SUMMARY
[de-identified] : I went over the pathophysiology of the patient's symptoms in great detail with the patient. I discussed the underlying pathophysiology of the patient's condition in great detail with the patient. I went over the patient's x-rays with them in great detail. At this time, he will start a course of physical therapy for strengthening and flexibility. A prescription was provided. We discussed the use of ice, Tylenol and anti-inflammatories to relieve pain. At-home strengthening exercises were discussed and demonstrated with the patient. A discussion ensued about shoe wear modifications. He needs to avoid high-impact activities such as running and jumping or riding a treadmill. I recommend alternative activities such as riding a stationary bike or elliptical on low tension. He should focus on light weight and high repetition exercises. He should avoid squatting and kneeling. The benefits of pool exercises were discussed in detail with the patient.   All of their questions were answered. They understand and consent to the plan.   FU in one month. After undergoing PT.

## 2023-08-21 NOTE — HISTORY OF PRESENT ILLNESS
[de-identified] : 78-year-old male presents complaining of left ankle pain. It started August 11th. He notes he is an avid walker and was walking on a track. He stepped into a divot. He does not recall whether he inverted or everted his ankle. He notes pain laterally with mild swelling. He just returned from [place] and was walking a lot. He was not able to walk as much as he would have liked due to discomfort. He purchased a sleeve for his ankle and has been wearing a post-op shoe, which has helped. He denies pain when sleeping. He notes he was wearing a left ankle band. He notes he was taking Tylenol.  PMHx- HTN (Valsartan, Amlodipine), HLD (Atorvastatin).   Radiology Results: o Left Knee: Standing AP, lateral, tunnel and skyline views were obtained and revealed significant medial and patellofemoral arthritis. No lytic lesions.

## 2024-06-12 ENCOUNTER — NON-APPOINTMENT (OUTPATIENT)
Age: 79
End: 2024-06-12

## 2024-07-08 ENCOUNTER — NON-APPOINTMENT (OUTPATIENT)
Age: 79
End: 2024-07-08

## 2024-08-14 ENCOUNTER — APPOINTMENT (OUTPATIENT)
Dept: ORTHOPEDIC SURGERY | Facility: CLINIC | Age: 79
End: 2024-08-14
Payer: MEDICARE

## 2024-08-14 VITALS — HEIGHT: 70 IN | BODY MASS INDEX: 24.77 KG/M2 | WEIGHT: 173 LBS

## 2024-08-14 DIAGNOSIS — M17.11 UNILATERAL PRIMARY OSTEOARTHRITIS, RIGHT KNEE: ICD-10-CM

## 2024-08-14 PROCEDURE — 99214 OFFICE O/P EST MOD 30 MIN: CPT | Mod: 25

## 2024-08-14 PROCEDURE — 73564 X-RAY EXAM KNEE 4 OR MORE: CPT | Mod: RT

## 2024-08-14 PROCEDURE — 20610 DRAIN/INJ JOINT/BURSA W/O US: CPT | Mod: RT

## 2024-08-14 RX ORDER — HYALURONATE SODIUM 20 MG/2 ML
20 SYRINGE (ML) INTRAARTICULAR
Qty: 1 | Refills: 0 | Status: ACTIVE | COMMUNITY
Start: 2024-08-14

## 2024-08-14 NOTE — DISCUSSION/SUMMARY
[de-identified] : I went over the pathophysiology of the patient's symptoms in great detail with the patient. I discussed the underlying pathophysiology of the patient's condition in great detail with the patient. I went over the patient's x-rays with them in great detail. He needs to avoid high-impact activities such as running and jumping or riding a treadmill. I recommend alternative activities such as riding a stationary bike or elliptical on low tension. He should focus on light weight and high repetition exercises. He should avoid squatting and kneeling. Viscosupplementation was discussed as a solution to the patient's symptoms, and we are requesting Euflexxa for the right knee. The patient elected to receive a cortisone injection into his right knee today, and tolerated it well. I instructed the patient on ROM exercises, and told them to take it easy. The use of ice and rest was reviewed with the patient. The patient may resume activities tomorrow.   All of their questions were answered. They understand and consent to the plan.   FU after authorization.

## 2024-08-14 NOTE — HISTORY OF PRESENT ILLNESS
[de-identified] : 79 year old male presents complaining of right knee pain. The patient cannot attribute his pain to any injury, fall, or trauma. He denies any swelling or buckling. He is on the treadmill 4x a week and does about 1 mile of walking. He denies that the pain wakes him from sleep. He localizes his pain in the medial compartment of his right knee. He notes he had a stress fracture in the past. He has not had any recent treatment for his right knee. he notes he would like to get back to walking like he used to. Of note, he had a recent epidural injection of his lower back.

## 2024-08-14 NOTE — ADDENDUM
[FreeTextEntry1] : I, CATY UMANA, acted solely as a scribe for Dr. Sanjiv Fernandes on this date 08/14/2024.  All medical record entries made by the Scribe were at my, Dr. Sanjiv Fernandes, direction and personally dictated by me on 08/14/2024. I have reviewed the chart and agree that the record accurately reflects my personal performance of the history, physical exam, assessment and plan. I have also personally directed, reviewed, and agreed with the chart.

## 2024-08-14 NOTE — PHYSICAL EXAM
[de-identified] : Constitutional o Appearance : well-nourished, well developed, alert, in no acute distress  Head and Face o Head :  Inspection : atraumatic, normocephalic o Face :  Inspection : no visible rash or discoloration Respiratory o Respiratory Effort: breathing unlabored  Neurologic o Mental Status Examination :  Orientation : grossly oriented to person, place and time Psychiatric o Mood and Affect: mood normal, affect appropriate   Right Lower Extremity o Buttock : no tenderness, swelling or deformities  o Right Hip :  Inspection/Palpation : no tenderness, swelling or deformities  Range of Motion : full and painless in all planes, no crepitance  Stability : joint stability intact  Strength : extension, flexion, adduction, abduction, internal rotation and external rotation 5/5   o Right Knee :  Inspection/Palpation : mild tenderness over the medial femoral condyle medial facet tenderness  to palpation, minimal swelling  Range of Motion : pain with flexion past 130, no crepitance, pliable plica,   Stability : no valgus or varus instability present on provocative testing  Strength : flexion and extension 5/5  Tests and Signs : Anterior Drawer negative, Lachman negative, Allie's negative  Left Lower Extremity o Buttock : no tenderness, swelling or deformities  o Left Hip :  Inspection/Palpation : no tenderness, no swelling or deformities  Range of Motion : full and painless in all planes, no crepitance  Stability : joint stability intact  Strength : extension, flexion, adduction, abduction, internal rotation and external rotation 5/5  o Left Knee :  Inspection/Palpation : no tenderness to palpation, no swelling  Range of Motion : active flexion and extension full and painless, no crepitance  Stability : no valgus or varus instability present on provocative testing  Strength : flexion and extension 5/5  Tests and Signs : Anterior Drawer negative, Lachman negative, Allie's negative  Gait: varus deformity of both knees, no significant extremity swelling or lymphedema  Radiology Results: 8/14/2024 Right Knee: Standing AP, lateral, tunnel and skyline views were obtained and reveal significant medial and moderate patellofemoral arthritis progressed since his last x rays of March 2014  o Knee injection : Indication- right knee osteoarthritis, Anatomic location- right intra-articular joint space, Spray - area was sterilized with Betadine and alcohol and anesthetized with Ethyl Chloride , needle used-20G, Medications given- 5cc's lidocaine, 0.5cc's kenalog, 0.5 cc's dexamethasone. The patient tolerated the procedure well.

## 2024-08-16 ENCOUNTER — NON-APPOINTMENT (OUTPATIENT)
Age: 79
End: 2024-08-16

## 2024-08-30 ENCOUNTER — NON-APPOINTMENT (OUTPATIENT)
Age: 79
End: 2024-08-30

## 2024-08-30 ENCOUNTER — APPOINTMENT (OUTPATIENT)
Dept: UROLOGY | Facility: CLINIC | Age: 79
End: 2024-08-30
Payer: MEDICARE

## 2024-08-30 VITALS
DIASTOLIC BLOOD PRESSURE: 67 MMHG | TEMPERATURE: 97.3 F | HEART RATE: 64 BPM | RESPIRATION RATE: 18 BRPM | SYSTOLIC BLOOD PRESSURE: 114 MMHG

## 2024-08-30 DIAGNOSIS — R39.9 UNSPECIFIED SYMPTOMS AND SIGNS INVOLVING THE GENITOURINARY SYSTEM: ICD-10-CM

## 2024-08-30 DIAGNOSIS — R35.1 NOCTURIA: ICD-10-CM

## 2024-08-30 PROCEDURE — G2211 COMPLEX E/M VISIT ADD ON: CPT

## 2024-08-30 PROCEDURE — 99203 OFFICE O/P NEW LOW 30 MIN: CPT

## 2024-08-30 NOTE — ASSESSMENT
[FreeTextEntry1] :  Mr. Scheier presents for initial evaluation of lower urinary tract symptoms Patient's outside labs reviewed-including CBC, lipid panel, urinalysis-all normal States symptoms started after recent COVID infection At baseline has no urinary complaints Reporting symptoms are improving, nighttime frequency most nights is down to 2  Discussed with patient my clinical suspicion for overactive bladder or BPH is low given that he only suffers from nighttime frequency symptoms.  In the absence of any sleep apnea, the only other concern I would have is for primary nocturnal polyuria.  In order to determine this, we would need him to complete a voiding diary to to establish his nocturnal urine index.  Otherwise, I feel observation is reasonable as his symptoms are slowly improving over time  Recommendations -3-day voiding diary-supplies and instructions provided -RTC 1 week to review

## 2024-08-30 NOTE — REVIEW OF SYSTEMS
[Poor quality erections] : Poor quality erections [Wake up at night to urinate  How many times?  ___] : wakes up to urinate [unfilled] times during the night [Negative] : Heme/Lymph

## 2024-08-30 NOTE — HISTORY OF PRESENT ILLNESS
[FreeTextEntry1] : 79M presents for initial evaluation of LUTS, nocturia   PMH significant for: HTN, HLD, TIA  PSH significant for: nothing  Significant meds: amlodipine, valsartan, atorvastatin, ASA   Patient reports months-long history of LUTS, nocturia  Reports severe level of distress  Associated with no other complaints Previously treated with nothing Reports recent contraction of strep throat and then COViD symptoms started thereafter  IPSS: 6, all irritative, all nocturia  Daytime Frequency: 4-5 Nighttime Frequency: 2-8 Pads per day: 0 Straining to void: No Intermittency: No Dribbling: No  Subjective Incomplete Emptying: No UTIs this past year: 0  Daily Fluid Total: 60 oz Daily Caffeine Total: 8 oz  Neurologic Hx, Vision or Balance changes: No

## 2024-09-03 LAB — BACTERIA UR CULT: NORMAL

## 2024-09-04 ENCOUNTER — APPOINTMENT (OUTPATIENT)
Dept: ORTHOPEDIC SURGERY | Facility: CLINIC | Age: 79
End: 2024-09-04

## 2024-09-05 ENCOUNTER — APPOINTMENT (OUTPATIENT)
Dept: UROLOGY | Facility: CLINIC | Age: 79
End: 2024-09-05

## 2024-09-11 ENCOUNTER — APPOINTMENT (OUTPATIENT)
Dept: ORTHOPEDIC SURGERY | Facility: CLINIC | Age: 79
End: 2024-09-11
Payer: MEDICARE

## 2024-09-11 VITALS — HEIGHT: 70 IN | WEIGHT: 172 LBS | BODY MASS INDEX: 24.62 KG/M2

## 2024-09-11 PROCEDURE — 20610 DRAIN/INJ JOINT/BURSA W/O US: CPT | Mod: RT

## 2024-09-11 NOTE — DISCUSSION/SUMMARY
[de-identified] : I went over the pathophysiology of the patient's symptoms in great detail with the patient. The patient elected to receive a Euflexxa injection into his  right knee today, and tolerated it well. I instructed the patient on ROM exercises, and told them to take it easy. The use of ice and rest was reviewed with the patient. The patient may resume activities tomorrow. I reminded the patient that it takes 4 to 6 weeks after the final injection to feel symptom relief.   All of their questions were answered. They understand and consent to the plan.   FU next week for 2/3 right knee Euflexxa.

## 2024-09-11 NOTE — PHYSICAL EXAM
[de-identified] : Constitutional o Appearance : well-nourished, well developed, alert, in no acute distress  Head and Face o Head :  Inspection : atraumatic, normocephalic o Face :  Inspection : no visible rash or discoloration Respiratory o Respiratory Effort: breathing unlabored  Neurologic o Mental Status Examination :  Orientation : grossly oriented to person, place and time Psychiatric o Mood and Affect: mood normal, affect appropriate   Right Lower Extremity o Buttock : no tenderness, swelling or deformities  o Right Hip :  Inspection/Palpation : no tenderness, swelling or deformities  Range of Motion : full and painless in all planes, no crepitance  Stability : joint stability intact  Strength : extension, flexion, adduction, abduction, internal rotation and external rotation 5/5   o Right Knee :  Inspection/Palpation : mild tenderness over the medial compartment tenderness  to palpation, minimal swelling  Range of Motion : pain with flexion past 130, no crepitance, pliable plica,   Stability : no valgus or varus instability present on provocative testing  Strength : flexion and extension 5/5  Tests and Signs : Anterior Drawer negative, Lachman negative, Allie's negative  Left Lower Extremity o Buttock : no tenderness, swelling or deformities  o Left Hip :  Inspection/Palpation : no tenderness, no swelling or deformities  Range of Motion : full and painless in all planes, no crepitance  Stability : joint stability intact  Strength : extension, flexion, adduction, abduction, internal rotation and external rotation 5/5  o Left Knee :  Inspection/Palpation : no tenderness to palpation, no swelling  Range of Motion : active flexion and extension full and painless, no crepitance  Stability : no valgus or varus instability present on provocative testing  Strength : flexion and extension 5/5  Tests and Signs : Anterior Drawer negative, Lachman negative, Allie's negative  Gait: varus deformity of both knees, no significant extremity swelling or lymphedema  o Knee injection : Indication- right knee osteoarthritis, Anatomic location- right intra-articular joint space, Spray - area was sterilized with Betadine and alcohol and anesthetized with Ethyl Chloride , needle used-20G, Medications given- 2cc's Euflexxa (1/3). The patient tolerated the procedure well.

## 2024-09-11 NOTE — ADDENDUM
[FreeTextEntry1] : I, CATY UMANA, acted solely as a scribe for Dr. Sanjiv Fernandes on this date 09/11/2024.  All medical record entries made by the Scribe were at my, Dr. Sanjiv Fernaneds, direction and personally dictated by me on 09/11/2024. I have reviewed the chart and agree that the record accurately reflects my personal performance of the history, physical exam, assessment and plan. I have also personally directed, reviewed, and agreed with the chart.

## 2024-09-11 NOTE — HISTORY OF PRESENT ILLNESS
[de-identified] : 79 year old male presents for a right knee 1/3 Euflexxa injection today 9/11/2024. He had a right knee cortisone injection on 8/14/2024 and notes it helped. We informed the patient it takes 4-6 weeks to feel symptoms relief. He notes his pain is on the medial aspect of his right knee. Of note, he had a recent epidural injection of his lower back.   Radiology Results: 8/14/2024 Right Knee: Standing AP, lateral, tunnel and skyline views were obtained and reveal significant medial and moderate patellofemoral arthritis progressed since his last x rays of March 2014

## 2024-09-18 ENCOUNTER — APPOINTMENT (OUTPATIENT)
Dept: ORTHOPEDIC SURGERY | Facility: CLINIC | Age: 79
End: 2024-09-18
Payer: MEDICARE

## 2024-09-18 VITALS — WEIGHT: 172 LBS | HEIGHT: 70 IN | BODY MASS INDEX: 24.62 KG/M2

## 2024-09-18 DIAGNOSIS — M17.12 UNILATERAL PRIMARY OSTEOARTHRITIS, LEFT KNEE: ICD-10-CM

## 2024-09-18 PROCEDURE — 20610 DRAIN/INJ JOINT/BURSA W/O US: CPT | Mod: RT

## 2024-09-18 NOTE — PHYSICAL EXAM
[de-identified] : Constitutional o Appearance : well-nourished, well developed, alert, in no acute distress  Head and Face o Head :  Inspection : atraumatic, normocephalic o Face :  Inspection : no visible rash or discoloration Respiratory o Respiratory Effort: breathing unlabored  Neurologic o Mental Status Examination :  Orientation : grossly oriented to person, place and time Psychiatric o Mood and Affect: mood normal, affect appropriate   Right Lower Extremity o Buttock : no tenderness, swelling or deformities  o Right Hip :  Inspection/Palpation : no tenderness, swelling or deformities  Range of Motion : full and painless in all planes, no crepitance  Stability : joint stability intact  Strength : extension, flexion, adduction, abduction, internal rotation and external rotation 5/5   o Right Knee :  Inspection/Palpation : mild tenderness over the medial compartment tenderness  to palpation, minimal swelling  Range of Motion : pain with flexion past 130, no crepitance, pliable plica,   Stability : no valgus or varus instability present on provocative testing  Strength : flexion and extension 5/5  Tests and Signs : Anterior Drawer negative, Lachman negative, Allie's negative  Left Lower Extremity o Buttock : no tenderness, swelling or deformities  o Left Hip :  Inspection/Palpation : no tenderness, no swelling or deformities  Range of Motion : full and painless in all planes, no crepitance  Stability : joint stability intact  Strength : extension, flexion, adduction, abduction, internal rotation and external rotation 5/5  o Left Knee :  Inspection/Palpation : no tenderness to palpation, no swelling  Range of Motion : active flexion and extension full and painless, no crepitance  Stability : no valgus or varus instability present on provocative testing  Strength : flexion and extension 5/5  Tests and Signs : Anterior Drawer negative, Lachman negative, Allie's negative  Gait: varus deformity of both knees, no significant extremity swelling or lymphedema  o Knee injection : Indication- right knee osteoarthritis, Anatomic location- right intra-articular joint space, Spray - area was sterilized with Betadine and alcohol and anesthetized with Ethyl Chloride , needle used-20G, Medications given- 2cc's Euflexxa (2/3). The patient tolerated the procedure well.

## 2024-09-18 NOTE — HISTORY OF PRESENT ILLNESS
[de-identified] : 79 year old male presents for a right knee 2/3 Euflexxa injection today 9/18/2024. He denies problems with the first injections. He had a right knee cortisone injection on 8/14/2024 and notes it helped. We informed the patient it takes 4-6 weeks to feel symptoms relief. He notes his pain is on the medial aspect of his right knee. Of note, he had a recent epidural injection of his lower back.  He denies an untoward reaction to the Euflexxa that he got last week.  Radiology Results: 8/14/2024 Right Knee: Standing AP, lateral, tunnel and skyline views were obtained and reveal significant medial and moderate patellofemoral arthritis progressed since his last x rays of March 2014

## 2024-09-18 NOTE — DISCUSSION/SUMMARY
[de-identified] : I went over the pathophysiology of the patient's symptoms in great detail with the patient. The patient elected to receive a Euflexxa injection into his  right knee today, and tolerated it well. I instructed the patient on ROM exercises, and told them to take it easy. The use of ice and rest was reviewed with the patient. The patient may resume activities tomorrow. I reminded the patient that it takes 4 to 6 weeks after the final injection to feel symptom relief.   All of their questions were answered. They understand and consent to the plan.   FU next week for 3/3 right knee Euflexxa.

## 2024-09-18 NOTE — ADDENDUM
[FreeTextEntry1] : I, CATY UMANA, acted solely as a scribe for Dr. Sanjiv Fernandes on this date 09/18/2024.  All medical record entries made by the Scribe were at my, Dr. Sanjiv Fernandes, direction and personally dictated by me on 09/18/2024. I have reviewed the chart and agree that the record accurately reflects my personal performance of the history, physical exam, assessment and plan. I have also personally directed, reviewed, and agreed with the chart.

## 2024-09-25 ENCOUNTER — APPOINTMENT (OUTPATIENT)
Dept: ORTHOPEDIC SURGERY | Facility: CLINIC | Age: 79
End: 2024-09-25
Payer: MEDICARE

## 2024-09-25 VITALS — BODY MASS INDEX: 24.62 KG/M2 | HEIGHT: 70 IN | WEIGHT: 172 LBS

## 2024-09-25 DIAGNOSIS — M17.11 UNILATERAL PRIMARY OSTEOARTHRITIS, RIGHT KNEE: ICD-10-CM

## 2024-09-25 PROCEDURE — 20610 DRAIN/INJ JOINT/BURSA W/O US: CPT | Mod: RT

## 2024-09-25 NOTE — PHYSICAL EXAM
[de-identified] : Constitutional o Appearance : well-nourished, well developed, alert, in no acute distress  Head and Face o Head :  Inspection : atraumatic, normocephalic o Face :  Inspection : no visible rash or discoloration Respiratory o Respiratory Effort: breathing unlabored  Neurologic o Mental Status Examination :  Orientation : grossly oriented to person, place and time Psychiatric o Mood and Affect: mood normal, affect appropriate   Right Lower Extremity o Buttock : no tenderness, swelling or deformities  o Right Hip :  Inspection/Palpation : no tenderness, swelling or deformities  Range of Motion : full and painless in all planes, no crepitance  Stability : joint stability intact  Strength : extension, flexion, adduction, abduction, internal rotation and external rotation 5/5   o Right Knee :  Inspection/Palpation : mild tenderness over the medial compartment tenderness  to palpation, minimal swelling  Range of Motion : pain with flexion past 130, no crepitance, pliable plica,   Stability : no valgus or varus instability present on provocative testing  Strength : flexion and extension 5/5  Tests and Signs : Anterior Drawer negative, Lachman negative, Allie's negative  Left Lower Extremity o Buttock : no tenderness, swelling or deformities  o Left Hip :  Inspection/Palpation : no tenderness, no swelling or deformities  Range of Motion : full and painless in all planes, no crepitance  Stability : joint stability intact  Strength : extension, flexion, adduction, abduction, internal rotation and external rotation 5/5  o Left Knee :  Inspection/Palpation : no tenderness to palpation, no swelling  Range of Motion : active flexion and extension full and painless, no crepitance  Stability : no valgus or varus instability present on provocative testing  Strength : flexion and extension 5/5  Tests and Signs : Anterior Drawer negative, Lachman negative, Allie's negative  Gait: varus deformity of both knees, no significant extremity swelling or lymphedema  o Knee injection : Indication- right knee osteoarthritis, Anatomic location- right intra-articular joint space, Spray - area was sterilized with Betadine and alcohol and anesthetized with Ethyl Chloride , needle used-20G, Medications given- 2cc's Euflexxa (3/3). The patient tolerated the procedure well.

## 2024-09-25 NOTE — HISTORY OF PRESENT ILLNESS
[de-identified] : 79 year old male presents for a right knee 3/3 Euflexxa injection today 9/25/2024. He denies problems with the first injections. He had a right knee cortisone injection on 8/14/2024 and notes it helped. We informed the patient it takes 4-6 weeks to feel symptoms relief. He notes his pain is on the medial aspect of his right knee. Of note, he had a recent epidural injection of his lower back.  He denies an untoward reaction to the Euflexxa that he got last week.  Radiology Results: 8/14/2024 Right Knee: Standing AP, lateral, tunnel and skyline views were obtained and reveal significant medial and moderate patellofemoral arthritis progressed since his last x rays of March 2014

## 2024-09-25 NOTE — ADDENDUM
[FreeTextEntry1] : I, CATY UMANA, acted solely as a scribe for Dr. Sanjiv Fernandes on this date 09/25/2024.  All medical record entries made by the Scribe were at my, Dr. Sanjiv Fernandes, direction and personally dictated by me on 09/25/2024. I have reviewed the chart and agree that the record accurately reflects my personal performance of the history, physical exam, assessment and plan. I have also personally directed, reviewed, and agreed with the chart.

## 2024-09-25 NOTE — DISCUSSION/SUMMARY
[de-identified] : I went over the pathophysiology of the patient's symptoms in great detail with the patient. The patient elected to receive a Euflexxa injection into his  right knee today, and tolerated it well. I instructed the patient on ROM exercises, and told them to take it easy. The use of ice and rest was reviewed with the patient. The patient may resume activities tomorrow. I reminded the patient that it takes 4 to 6 weeks after the final injection to feel symptom relief.   All of their questions were answered. They understand and consent to the plan.   FU in 6 weeks.

## 2024-12-03 NOTE — PATIENT PROFILE ADULT - DISASTER - NSTOBACCOWITHDRW_GEN_A_CORE_SD
his elderly father with needed activities without any increase in pain in left shoulder.   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    Patient Education:   []  HEP/Education Completed: Plan of Care, Goals, HEP - scapular retraction, scapular shrugs, table slides to 90 flexion, ER dowel in supine to 20; use of ice  11/8/24: pulleys for shoulder flexion to 90, seated ER with dowel; to do ER stretch as the last stretch with HEP  []  No new Education completed  [x]  Reviewed Prior HEP      [x]  Patient verbalized and/or demonstrated understanding of education provided.  []  Patient unable to verbalize and/or demonstrate understanding of education provided.  Will continue education.  [x]  Barriers to learning: none    PLAN:      []  Plan of care initiated.  Plan to see patient 2 times per week for 16 weeks to address the treatment planned outlined above.  [x]  Continue with current plan of care  []  Modify plan of care as follows:    []  Hold pending physician visit  []  Discharge    Time In 1430   Time Out 1500   Timed Code Minutes: 30 min   Total Treatment Time: 30 min       Yesica Salazar, OTR/L #[id number]               
none